# Patient Record
Sex: MALE | Race: OTHER | NOT HISPANIC OR LATINO | Employment: UNEMPLOYED | URBAN - METROPOLITAN AREA
[De-identification: names, ages, dates, MRNs, and addresses within clinical notes are randomized per-mention and may not be internally consistent; named-entity substitution may affect disease eponyms.]

---

## 2020-02-09 ENCOUNTER — HOSPITAL ENCOUNTER (EMERGENCY)
Facility: HOSPITAL | Age: 30
Discharge: HOME | End: 2020-02-10
Attending: EMERGENCY MEDICINE
Payer: MEDICAID

## 2020-02-09 DIAGNOSIS — R09.1 PLEURISY: Primary | ICD-10-CM

## 2020-02-09 DIAGNOSIS — R07.9 CHEST PAIN: ICD-10-CM

## 2020-02-09 LAB
ALBUMIN SERPL BCP-MCNC: 4.1 G/DL (ref 3.5–5.2)
ALP SERPL-CCNC: 53 U/L (ref 55–135)
ALT SERPL W/O P-5'-P-CCNC: 34 U/L (ref 10–44)
ANION GAP SERPL CALC-SCNC: 11 MMOL/L (ref 8–16)
AST SERPL-CCNC: 27 U/L (ref 10–40)
BASOPHILS # BLD AUTO: 0.06 K/UL (ref 0–0.2)
BASOPHILS NFR BLD: 0.6 % (ref 0–1.9)
BILIRUB SERPL-MCNC: 0.3 MG/DL (ref 0.1–1)
BUN SERPL-MCNC: 14 MG/DL (ref 6–20)
CALCIUM SERPL-MCNC: 9.3 MG/DL (ref 8.7–10.5)
CHLORIDE SERPL-SCNC: 107 MMOL/L (ref 95–110)
CO2 SERPL-SCNC: 23 MMOL/L (ref 23–29)
CREAT SERPL-MCNC: 0.9 MG/DL (ref 0.5–1.4)
D DIMER PPP IA.FEU-MCNC: 0.51 MG/L FEU
DIFFERENTIAL METHOD: ABNORMAL
EOSINOPHIL # BLD AUTO: 0.6 K/UL (ref 0–0.5)
EOSINOPHIL NFR BLD: 5.7 % (ref 0–8)
ERYTHROCYTE [DISTWIDTH] IN BLOOD BY AUTOMATED COUNT: 12.3 % (ref 11.5–14.5)
EST. GFR  (AFRICAN AMERICAN): >60 ML/MIN/1.73 M^2
EST. GFR  (NON AFRICAN AMERICAN): >60 ML/MIN/1.73 M^2
GLUCOSE SERPL-MCNC: 119 MG/DL (ref 70–110)
HCT VFR BLD AUTO: 45.1 % (ref 40–54)
HGB BLD-MCNC: 15 G/DL (ref 14–18)
IMM GRANULOCYTES # BLD AUTO: 0.04 K/UL (ref 0–0.04)
IMM GRANULOCYTES NFR BLD AUTO: 0.4 % (ref 0–0.5)
LYMPHOCYTES # BLD AUTO: 3.3 K/UL (ref 1–4.8)
LYMPHOCYTES NFR BLD: 33.9 % (ref 18–48)
MAGNESIUM SERPL-MCNC: 2.1 MG/DL (ref 1.6–2.6)
MCH RBC QN AUTO: 30.7 PG (ref 27–31)
MCHC RBC AUTO-ENTMCNC: 33.3 G/DL (ref 32–36)
MCV RBC AUTO: 92 FL (ref 82–98)
MONOCYTES # BLD AUTO: 1 K/UL (ref 0.3–1)
MONOCYTES NFR BLD: 10.4 % (ref 4–15)
NEUTROPHILS # BLD AUTO: 4.8 K/UL (ref 1.8–7.7)
NEUTROPHILS NFR BLD: 49 % (ref 38–73)
NRBC BLD-RTO: 0 /100 WBC
PLATELET # BLD AUTO: 194 K/UL (ref 150–350)
PMV BLD AUTO: 11.2 FL (ref 9.2–12.9)
POTASSIUM SERPL-SCNC: 4.1 MMOL/L (ref 3.5–5.1)
PROT SERPL-MCNC: 7.2 G/DL (ref 6–8.4)
RBC # BLD AUTO: 4.89 M/UL (ref 4.6–6.2)
SODIUM SERPL-SCNC: 141 MMOL/L (ref 136–145)
TROPONIN I SERPL DL<=0.01 NG/ML-MCNC: <0.006 NG/ML (ref 0–0.03)
WBC # BLD AUTO: 9.78 K/UL (ref 3.9–12.7)

## 2020-02-09 PROCEDURE — 84484 ASSAY OF TROPONIN QUANT: CPT

## 2020-02-09 PROCEDURE — 80053 COMPREHEN METABOLIC PANEL: CPT

## 2020-02-09 PROCEDURE — 99285 EMERGENCY DEPT VISIT HI MDM: CPT | Mod: 25

## 2020-02-09 PROCEDURE — 85379 FIBRIN DEGRADATION QUANT: CPT

## 2020-02-09 PROCEDURE — 83735 ASSAY OF MAGNESIUM: CPT

## 2020-02-09 PROCEDURE — 93010 EKG 12-LEAD: ICD-10-PCS | Mod: ,,, | Performed by: INTERNAL MEDICINE

## 2020-02-09 PROCEDURE — 63600175 PHARM REV CODE 636 W HCPCS: Performed by: EMERGENCY MEDICINE

## 2020-02-09 PROCEDURE — 93010 ELECTROCARDIOGRAM REPORT: CPT | Mod: ,,, | Performed by: INTERNAL MEDICINE

## 2020-02-09 PROCEDURE — 96374 THER/PROPH/DIAG INJ IV PUSH: CPT

## 2020-02-09 PROCEDURE — 85025 COMPLETE CBC W/AUTO DIFF WBC: CPT

## 2020-02-09 PROCEDURE — 93005 ELECTROCARDIOGRAM TRACING: CPT

## 2020-02-09 RX ORDER — KETOROLAC TROMETHAMINE 30 MG/ML
15 INJECTION, SOLUTION INTRAMUSCULAR; INTRAVENOUS
Status: COMPLETED | OUTPATIENT
Start: 2020-02-09 | End: 2020-02-09

## 2020-02-09 RX ADMIN — KETOROLAC TROMETHAMINE 15 MG: 30 INJECTION, SOLUTION INTRAMUSCULAR at 11:02

## 2020-02-10 VITALS
HEART RATE: 70 BPM | TEMPERATURE: 98 F | SYSTOLIC BLOOD PRESSURE: 116 MMHG | RESPIRATION RATE: 16 BRPM | OXYGEN SATURATION: 95 % | WEIGHT: 210 LBS | DIASTOLIC BLOOD PRESSURE: 73 MMHG

## 2020-02-10 RX ORDER — MELOXICAM 7.5 MG/1
7.5 TABLET ORAL DAILY
Qty: 20 TABLET | Refills: 0 | OUTPATIENT
Start: 2020-02-10 | End: 2023-06-25

## 2020-02-10 NOTE — ED TRIAGE NOTES
Pt presents to ED c/o R side chest pain for 2-3 days. Pt reports pain with inspiration. Denies injury. Denies medical hx. Also c/o headache.

## 2020-02-10 NOTE — ED PROVIDER NOTES
"Encounter Date: 2/9/2020    SCRIBE #1 NOTE: I, Terence Carrillo, am scribing for, and in the presence of,  Jeff Gates MD. I have scribed the following portions of the note - Other sections scribed: HPI, ROS, PE.       History     Chief Complaint   Patient presents with    Shortness of Breath     pt states "sometimes it feels like it's hard to breathe" ongoing for 2-3 days; pt also reports pain to right, lower, anterior rib cage when taking deep breathes; pt denies any injuries to chest; pt reports SOB is much worse when sitting down with legs up; no distress noted at triage     This is a 29 y.o. Male with a PMHx of kidney stones who presents to the ED with c/o intermittent lower, anterior, right-sided CP rated 9/10 and SOB that began 2 days ago. CP worsens with breathing and coughing. Denies taking any meds for current symptoms. Denies any recent injury, fever, dysuria, hematuria, back pain, abd pain, leg swelling, recent travel, or any other symptoms. Pt notes that he had a cough yesterday which reproduced the pain but that has since alleviated.  Cough is nonproductive.  Denies trauma. No recent heavy lifting.  The pt smokes cigarettes daily.  No family history of DVT or pulmonary embolism.  No palpitations.    The history is provided by the patient and medical records.     Review of patient's allergies indicates:  No Known Allergies  No past medical history on file.  No past surgical history on file.  No family history on file.  Social History     Tobacco Use    Smoking status: Not on file   Substance Use Topics    Alcohol use: Not on file    Drug use: Not on file     Review of Systems   Constitutional: Negative for chills, diaphoresis and fever.   HENT: Negative for congestion and sore throat.    Eyes: Negative.  Negative for visual disturbance.   Respiratory: Positive for shortness of breath. Negative for cough and wheezing.    Cardiovascular: Positive for chest pain. Negative for palpitations and leg " swelling.   Gastrointestinal: Negative for abdominal pain, diarrhea, nausea and vomiting.        NO melena or rectal bleeding   Genitourinary: Negative for dysuria, flank pain, frequency and hematuria.   Musculoskeletal: Negative for back pain.   Skin: Negative for rash and wound.   Neurological: Negative for dizziness, syncope, weakness, numbness and headaches.        No numbness   Psychiatric/Behavioral: Negative for confusion.   All other systems reviewed and are negative.      Physical Exam     Initial Vitals [02/09/20 2300]   BP Pulse Resp Temp SpO2   (!) 153/91 75 18 97.5 °F (36.4 °C) 99 %      MAP       --         Physical Exam    Nursing note and vitals reviewed.  Constitutional: He appears well-developed and well-nourished. He is not diaphoretic. No distress.   HENT:   Head: Normocephalic and atraumatic.   Nose: Nose normal.   Mouth/Throat: Oropharynx is clear and moist.   Eyes: Conjunctivae and EOM are normal. Pupils are equal, round, and reactive to light. Right eye exhibits no discharge. Left eye exhibits no discharge.   Neck: Neck supple. No tracheal deviation present.   Cardiovascular: Normal rate, regular rhythm and normal heart sounds.   No murmur heard.  Pulmonary/Chest: Breath sounds normal. No stridor. No respiratory distress. He has no wheezes. He has no rhonchi. He has no rales. He exhibits no tenderness.   Abdominal: Soft. He exhibits no distension. There is no tenderness. There is no rebound and no guarding.   Musculoskeletal: Normal range of motion. He exhibits no edema or tenderness.   Neurological: He is alert and oriented to person, place, and time.   Skin: Skin is warm and dry. No rash noted.   Psychiatric: He has a normal mood and affect. His behavior is normal. Judgment and thought content normal.         ED Course   Procedures  Labs Reviewed - No data to display  EKG Readings: (Independently Interpreted)   Initial Reading: No STEMI. Rhythm: Normal Sinus Rhythm. Heart Rate: 72. Ectopy:  No Ectopy. Conduction: Normal. ST Segments: Normal ST Segments. T Waves: Normal. Clinical Impression: Normal Sinus Rhythm       Imaging Results    None          Medical Decision Making:   History:   Old Medical Records: I decided to obtain old medical records.  Initial Assessment:   Patient presents for evaluation of right-sided pleuritic chest pain. Normal pulmonary exam.  No rib cage tenderness. Normal EKG.  No clinical evidence of DVT.  Suspect pleurisy.  Will obtain chest x-ray.  Will check lab work.  Perc score is 0.  Differential Diagnosis:   Pleurisy, costochondritis pneumothorax, pneumonia, pleural effusion, pulmonary embolism  Independently Interpreted Test(s):   I have ordered and independently interpreted X-rays - see prior notes.  I have ordered and independently interpreted EKG Reading(s) - see prior notes  Clinical Tests:   Lab Tests: Ordered and Reviewed  The following lab test(s) were unremarkable: CBC, CMP, D-Dimer and Troponin  Radiological Study: Ordered  Medical Tests: Ordered and Reviewed  ED Management:  0020:  Pain resolved.  Chest x-ray normal. Lab work is unremarkable except for minimal elevation D-dimer which I think is clinically insignificant.  Perc score is 0.  Heart score is 0.  Will treat patient for pleurisy.  Patient advised to quit smoking.    Additional MDM:   PERC Rule:   Age is greater than or equal to 50 = 0.0  Heart Rate is greater than or equal to 100 = 0.0  SaO2 on room air < 95% = 0.0  Unilateral leg swelling = 0.0  Hemoptysis = 0.0  Recent surgery or trauma = 0.0  Prior PE or DVT =  0.0  Hormone use = 0.00  PERC Score = 0  Heart Score:    History:          Slightly suspicious.  ECG:             Normal  Age:               Less than 45 years  Risk factors: no risk factors known  Troponin:       Less than or equal to normal limit  Final Score: 0             Scribe Attestation:   Scribe #1: I performed the above scribed service and the documentation accurately describes the  services I performed. I attest to the accuracy of the note.                          Clinical Impression:       ICD-10-CM ICD-9-CM   1. Chest pain R07.9 786.50   2. Chest pain R07.9 786.50         Disposition:   Disposition: Discharged  Condition: Stable     I, Jeff Gates MD, personally performed the services described in this documentation. All medical record entries made by the scribe were at my direction and in my presence.  I have reviewed the chart and agree that the record reflects my personal performance and is accurate and complete                    Jeff Gates MD  02/10/20 0020

## 2020-03-15 ENCOUNTER — HOSPITAL ENCOUNTER (EMERGENCY)
Facility: HOSPITAL | Age: 30
Discharge: HOME | End: 2020-03-15
Attending: EMERGENCY MEDICINE
Payer: MEDICAID

## 2020-03-15 VITALS
WEIGHT: 198 LBS | SYSTOLIC BLOOD PRESSURE: 123 MMHG | RESPIRATION RATE: 18 BRPM | OXYGEN SATURATION: 98 % | HEIGHT: 66 IN | BODY MASS INDEX: 31.82 KG/M2 | DIASTOLIC BLOOD PRESSURE: 59 MMHG | HEART RATE: 50 BPM | TEMPERATURE: 97 F

## 2020-03-15 DIAGNOSIS — N20.0 KIDNEY STONE: Primary | ICD-10-CM

## 2020-03-15 LAB
BILIRUB UR QL STRIP: NEGATIVE
CLARITY UR: CLEAR
COLOR UR: ABNORMAL
GLUCOSE UR QL STRIP: NEGATIVE
HGB UR QL STRIP: ABNORMAL
KETONES UR QL STRIP: NEGATIVE
LEUKOCYTE ESTERASE UR QL STRIP: NEGATIVE
MICROSCOPIC COMMENT: ABNORMAL
NITRITE UR QL STRIP: NEGATIVE
PH UR STRIP: 6 [PH] (ref 5–8)
PROT UR QL STRIP: NEGATIVE
RBC #/AREA URNS HPF: 15 /HPF (ref 0–4)
SP GR UR STRIP: 1 (ref 1–1.03)
URN SPEC COLLECT METH UR: ABNORMAL
UROBILINOGEN UR STRIP-ACNC: NEGATIVE EU/DL
WBC #/AREA URNS HPF: 2 /HPF (ref 0–5)

## 2020-03-15 PROCEDURE — 99283 EMERGENCY DEPT VISIT LOW MDM: CPT

## 2020-03-15 PROCEDURE — 25000003 PHARM REV CODE 250: Performed by: PHYSICIAN ASSISTANT

## 2020-03-15 PROCEDURE — 81000 URINALYSIS NONAUTO W/SCOPE: CPT

## 2020-03-15 RX ORDER — PHENAZOPYRIDINE HYDROCHLORIDE 200 MG/1
200 TABLET, FILM COATED ORAL 3 TIMES DAILY
Qty: 15 TABLET | Refills: 0 | Status: SHIPPED | OUTPATIENT
Start: 2020-03-15 | End: 2020-03-20

## 2020-03-15 RX ORDER — PHENAZOPYRIDINE HYDROCHLORIDE 100 MG/1
200 TABLET, FILM COATED ORAL
Status: COMPLETED | OUTPATIENT
Start: 2020-03-15 | End: 2020-03-15

## 2020-03-15 RX ORDER — IBUPROFEN 600 MG/1
600 TABLET ORAL
Status: COMPLETED | OUTPATIENT
Start: 2020-03-15 | End: 2020-03-15

## 2020-03-15 RX ADMIN — IBUPROFEN 600 MG: 600 TABLET, FILM COATED ORAL at 11:03

## 2020-03-15 RX ADMIN — PHENAZOPYRIDINE HYDROCHLORIDE 200 MG: 100 TABLET ORAL at 11:03

## 2020-03-15 NOTE — DISCHARGE INSTRUCTIONS
Drink plenty of fluids. This increases urine flow and reduces the chance that a new stone will form. Healthy adults (no heart/liver/kidney disease) who have had a kidney stone should drink 12, 8-ounce glasses of fluids per day. Most of this should be water. The goal is to produce 1.5 to 2 quarts of almost colorless urine per 24 hours.  Take Pyridium as needed for urinary discomfort. A common side effect is turning your urine orange, this is common and harmless.  Follow-up with Urology with a collected stone if possible.  Return to the ED for any concerning symptoms.    Our goal in the emergency department is to always give you outstanding care and exceptional service. You may receive a survey by mail or e-mail in the next week regarding your experience in our ED. We would greatly appreciate your completing and returning the survey. Your feedback provides us with a way to recognize our staff who give very good care and it helps us learn how to improve when your experience was below our aspiration of excellence.

## 2020-03-15 NOTE — ED PROVIDER NOTES
Encounter Date: 3/15/2020    SCRIBE #1 NOTE: I, Loan Daly, am scribing for, and in the presence of,  Arielle Booth PA-C. I have scribed the following portions of the note - Other sections scribed: HPI, ROS, PE.       History     Chief Complaint   Patient presents with    Flank Pain     right flank pain started today.  been having burning with urination x 2 days.  denies n/v/d or fever.     30 year old male patient with a past medical history of kidney stones presents to the ED complaining of 2-3 days of dysuria. He also reports right flank pain onset today that resolved after giving his urine sample at the ED. He reports that his symptoms felt similar to the last time he had kidney stones. He also complains of right-sided otalgia status post cleaning his ears. He reports that he attempted treatment with ear drops. He denies any chills, fever, sore throat, abdominal pain, nausea, emesis, penile discharge, or penile pain.     The history is provided by the patient.     Review of patient's allergies indicates:  No Known Allergies  History reviewed. No pertinent past medical history.  History reviewed. No pertinent surgical history.  History reviewed. No pertinent family history.  Social History     Tobacco Use    Smoking status: Current Every Day Smoker    Smokeless tobacco: Never Used   Substance Use Topics    Alcohol use: Never     Frequency: Never    Drug use: Never     Review of Systems   Constitutional: Negative for chills and fever.   HENT: Positive for ear pain (Right). Negative for sore throat.    Eyes: Negative for visual disturbance.   Respiratory: Negative for shortness of breath.    Cardiovascular: Negative for chest pain.   Gastrointestinal: Negative for abdominal pain, diarrhea, nausea and vomiting.   Genitourinary: Positive for dysuria and flank pain (Right). Negative for discharge and penile pain.   Musculoskeletal: Negative for neck pain.   Skin: Negative for rash.   Allergic/Immunologic:  Negative for immunocompromised state.   Neurological: Negative for headaches.   Psychiatric/Behavioral: Negative for dysphoric mood.       Physical Exam     Initial Vitals [03/15/20 0905]   BP Pulse Resp Temp SpO2   (!) 148/70 (!) 50 15 97.5 °F (36.4 °C) 100 %      MAP       --         Physical Exam    Nursing note and vitals reviewed.  Constitutional: He appears well-developed and well-nourished. He is not diaphoretic. No distress.   HENT:   Head: Normocephalic and atraumatic.   Right Ear: Tympanic membrane normal.   Left Ear: Tympanic membrane normal.   Mouth/Throat: Oropharynx is clear and moist. No oropharyngeal exudate.   Eyes: Conjunctivae and EOM are normal. Pupils are equal, round, and reactive to light. No scleral icterus.   Neck: Normal range of motion. Neck supple.   Cardiovascular: Normal rate, regular rhythm, normal heart sounds and intact distal pulses.   No murmur heard.  Pulmonary/Chest: Breath sounds normal. No respiratory distress. He has no wheezes. He has no rales.   Abdominal: Soft. Bowel sounds are normal. There is no tenderness. There is no rebound and no guarding.   Abdomen soft, nondistended, and nontender.  No rebound or guarding.  Very mild right CVAT.   Musculoskeletal: Normal range of motion. He exhibits no edema or tenderness.   Neurological: He is alert and oriented to person, place, and time. GCS score is 15. GCS eye subscore is 4. GCS verbal subscore is 5. GCS motor subscore is 6.   Skin: Skin is warm and dry.   Psychiatric: He has a normal mood and affect. Thought content normal.         ED Course   Procedures  Labs Reviewed   URINALYSIS, REFLEX TO URINE CULTURE - Abnormal; Notable for the following components:       Result Value    Occult Blood UA 3+ (*)     All other components within normal limits    Narrative:     Preferred Collection Type->Urine, Clean Catch   URINALYSIS MICROSCOPIC - Abnormal; Notable for the following components:    RBC, UA 15 (*)     All other components  within normal limits    Narrative:     Preferred Collection Type->Urine, Clean Catch          Imaging Results    None          Medical Decision Making:   History:   Old Medical Records: I decided to obtain old medical records.  Clinical Tests:   Lab Tests: Reviewed and Ordered       APC / Resident Notes:   30 year old presenting 2/2 kidney stone pain. Patient is otherwise well appearing with low suspicion for sepsis, dissection, infected obstructed renal colic, appendicitis, torsion, acute edis, or intra-abdominal infection.     While in the ED, patient passed kidney stone; reports near-complete resolution of pain.   Patient given Pyridium and Toradol for interventions which improved pain and condition. UA without evidence of infection. Vital signs are reassuring. I do not feel additional workup is necessary at this time.    Discussed conservative management, strict return precautions and follow up with urology. Will discharge home with pain medications and strainer. Patient tolerating PO and pain controlled prior to discharge. Strict return precautions for infected stone or PO intolerance discussed. Return precautions given, patient understands and agrees with plan. All questions answered.  Instructed to follow up with PCP.                               Clinical Impression:       ICD-10-CM ICD-9-CM   1. Kidney stone N20.0 592.0         Disposition:   Disposition: Discharged  Condition: Stable     ED Disposition Condition    Discharge Stable        ED Prescriptions     Medication Sig Dispense Start Date End Date Auth. Provider    phenazopyridine (PYRIDIUM) 200 MG tablet Take 1 tablet (200 mg total) by mouth 3 (three) times daily. for 5 days 15 tablet 3/15/2020 3/20/2020 Arielle Booth PA-C        Follow-up Information     Follow up With Specialties Details Why Contact [facility] - Nephrology/Colon & Rectal/Urology Nephrology, Colon and Rectal Surgery, Urology Schedule an appointment as soon  as possible for a visit  To follow-up with Urology [address] [place] [id number]  [phone]                          I, Arielle Booth, personally performed the services described in this documentation. All medical record entries made by the scribe were at my direction and in my presence. I have reviewed the chart and agree that the record reflects my personal performance and is accurate and complete.             Arielle Booth PA-C  03/15/20 8537

## 2020-03-15 NOTE — ED TRIAGE NOTES
Pt c/o burning with urination, urinary urgency x3 days, RIGHT-flank pain that started today. Pt reports hx of kidney stones and states that's what it feels like. Denies N/V/D, penile discharge. Pain is 8/10 when urinating

## 2020-05-01 ENCOUNTER — HOSPITAL ENCOUNTER (EMERGENCY)
Facility: HOSPITAL | Age: 30
Discharge: HOME | End: 2020-05-01
Attending: EMERGENCY MEDICINE
Payer: MEDICAID

## 2020-05-01 VITALS
DIASTOLIC BLOOD PRESSURE: 81 MMHG | OXYGEN SATURATION: 97 % | RESPIRATION RATE: 18 BRPM | WEIGHT: 200 LBS | HEART RATE: 72 BPM | SYSTOLIC BLOOD PRESSURE: 124 MMHG | BODY MASS INDEX: 23.62 KG/M2 | HEIGHT: 77 IN | TEMPERATURE: 98 F

## 2020-05-01 DIAGNOSIS — R42 DIZZINESS: ICD-10-CM

## 2020-05-01 LAB
BILIRUB UR QL STRIP: NEGATIVE
CLARITY UR: CLEAR
COLOR UR: YELLOW
GLUCOSE UR QL STRIP: NEGATIVE
HGB UR QL STRIP: NEGATIVE
KETONES UR QL STRIP: NEGATIVE
LEUKOCYTE ESTERASE UR QL STRIP: NEGATIVE
NITRITE UR QL STRIP: NEGATIVE
PH UR STRIP: 5 [PH] (ref 5–8)
PROT UR QL STRIP: NEGATIVE
SP GR UR STRIP: 1.02 (ref 1–1.03)
URN SPEC COLLECT METH UR: NORMAL
UROBILINOGEN UR STRIP-ACNC: NEGATIVE EU/DL

## 2020-05-01 PROCEDURE — 25000003 PHARM REV CODE 250: Performed by: NURSE PRACTITIONER

## 2020-05-01 PROCEDURE — 81003 URINALYSIS AUTO W/O SCOPE: CPT

## 2020-05-01 PROCEDURE — 93010 ELECTROCARDIOGRAM REPORT: CPT | Mod: ,,, | Performed by: INTERNAL MEDICINE

## 2020-05-01 PROCEDURE — 93005 ELECTROCARDIOGRAM TRACING: CPT

## 2020-05-01 PROCEDURE — 93010 EKG 12-LEAD: ICD-10-PCS | Mod: ,,, | Performed by: INTERNAL MEDICINE

## 2020-05-01 PROCEDURE — 99283 EMERGENCY DEPT VISIT LOW MDM: CPT | Mod: 25

## 2020-05-01 RX ORDER — MECLIZINE HYDROCHLORIDE 25 MG/1
25 TABLET ORAL
Status: COMPLETED | OUTPATIENT
Start: 2020-05-01 | End: 2020-05-01

## 2020-05-01 RX ORDER — MECLIZINE HYDROCHLORIDE 25 MG/1
25 TABLET ORAL 3 TIMES DAILY PRN
Qty: 20 TABLET | Refills: 0 | OUTPATIENT
Start: 2020-05-01 | End: 2023-06-25

## 2020-05-01 RX ADMIN — MECLIZINE HYDROCHLORIDE 25 MG: 25 TABLET ORAL at 02:05

## 2020-05-01 NOTE — DISCHARGE INSTRUCTIONS
Thank you for allowing me to care for you today.  I hope our treatment plan will make you feel better in the next few days.  In order for me to take better care of my future patients and improve our Emergency Department, I would appreciate if you can provide us with feedback.  In the next few days, you may receive a survey in the mail.  If you do, it would mean a great deal to me if you would please take the time to complete it.    Thank you and I hope you feel better.  Arielle Henderson NP

## 2020-05-01 NOTE — ED TRIAGE NOTES
"Pt presents to the ED via personal transportation reporting that he is currently fasting but broke it due to feeling "drowsy and not completely awake" x 2 days. Pt also reporting generalized weakness. Pt denies pain at this time. Pt also reporting a cough as well. Pt AAOx4.  "

## 2020-05-02 NOTE — ED PROVIDER NOTES
"Encounter Date: 5/1/2020       History     Chief Complaint   Patient presents with    Dizziness     Reports started feeling dizzy with fasting, broke fast yesterday and still has dizziness, feeling foggy, and coughing, denies fevers     The patient is a 30 year-old male with no pertinent past medical history who presents to the ED complaining of dizziness for the past 2-3 days that he describes as a sensation of the room spinning.  He also reports associated "fogginess danielle like I'm drunk".  Dizziness symptoms are worse with change of head position.  No history of similar complaints in the past.  No known history of vertigo.  He denies fever, chills, visual disturbance, nausea, vomiting, abdominal pain, and urinary complaints.  No recent URI symptoms.  He has been participating in [employer] fasting, but broke fast yesterday.  No known history of blood sugar issues.  No treatment attempting prior to arrival.    The history is provided by the patient.     Review of patient's allergies indicates:  No Known Allergies  History reviewed. No pertinent past medical history.  History reviewed. No pertinent surgical history.  History reviewed. No pertinent family history.  Social History     Tobacco Use    Smoking status: Current Every Day Smoker    Smokeless tobacco: Never Used   Substance Use Topics    Alcohol use: Never     Frequency: Never    Drug use: Never     Review of Systems   Constitutional: Negative for chills and fever.   HENT: Negative for sore throat.    Eyes: Negative for visual disturbance.   Respiratory: Negative for cough and shortness of breath.    Cardiovascular: Negative for chest pain.   Gastrointestinal: Negative for abdominal pain, nausea and vomiting.   Genitourinary: Negative for dysuria.   Musculoskeletal: Negative for back pain.   Skin: Negative for rash.   Neurological: Positive for dizziness. Negative for weakness.   Hematological: Does not bruise/bleed easily.       Physical Exam "     Initial Vitals [05/01/20 1335]   BP Pulse Resp Temp SpO2   132/81 79 20 98.4 °F (36.9 °C) 98 %      MAP       --         Physical Exam    Nursing note and vitals reviewed.  Constitutional: He appears well-developed and well-nourished.  Non-toxic appearance. No distress.   The patient is alert, oriented, and nontoxic-appearing.  No apparent distress.   HENT:   Head: Normocephalic and atraumatic.   Right Ear: Hearing and abnromal external ear normal.   Left Ear: Hearing and abnormal external ear normal.   Nose: Nose abnormal.   Mouth/Throat: Uvula is midline, oropharynx is clear and moist and mucous membranes are normal.   No facial asymmetry   Eyes: Conjunctivae are normal. Pupils are equal, round, and reactive to light. Right eye exhibits nystagmus. Left eye exhibits nystagmus. Right pupil is round and reactive. Left pupil is round and reactive. Pupils are equal.   Subtle horizontal nystagmus noted   Neck: Full passive range of motion without pain. Neck supple.   No meningismus   Cardiovascular: Normal rate, regular rhythm, normal heart sounds and normal pulses. Exam reveals no gallop and no friction rub.    No murmur heard.  Pulses:       Radial pulses are 2+ on the right side, and 2+ on the left side.        Dorsalis pedis pulses are 2+ on the right side, and 2+ on the left side.   Pulmonary/Chest: Effort normal and breath sounds normal. No respiratory distress. He has no decreased breath sounds. He has no wheezes. He has no rhonchi. He has no rales.   Abdominal: Soft. Bowel sounds are normal. There is no tenderness. There is no rigidity, no rebound and no guarding.   Musculoskeletal: Normal range of motion.   Neurological: He is alert and oriented to person, place, and time. He has normal strength. No cranial nerve deficit or sensory deficit. He exhibits normal muscle tone. He displays no seizure activity. Coordination and gait normal. GCS eye subscore is 4. GCS verbal subscore is 5. GCS motor subscore is 6.    No sensory deficits.  Muscular strength is normal and equal bilaterally.  No pronator drift.  Gait is steady and even.  Finger to nose, heel to shin, and alternating hands are all within normal limits.  Positive left-sided eli hallpike.   Skin: Skin is warm, dry and intact. Capillary refill takes less than 2 seconds. No rash noted.   Psychiatric: He has a normal mood and affect. His speech is normal and behavior is normal. Judgment and thought content normal. Cognition and memory are normal.         ED Course   Procedures  Labs Reviewed   URINALYSIS, REFLEX TO URINE CULTURE    Narrative:     Preferred Collection Type->Urine, Clean Catch     EKG Readings: (Independently Interpreted)   Initial Reading: No STEMI. Rhythm: Normal Sinus Rhythm. Heart Rate: 62. Ectopy: No Ectopy. Conduction: Normal.       Imaging Results    None          Medical Decision Making:   History:   Old Medical Records: I decided to obtain old medical records.  Clinical Tests:   Lab Tests: Ordered and Reviewed  Medical Tests: Ordered and Reviewed  ED Management:  This is an emergent evaluation of a 30 year-old male who presents to the ED complaining of dizziness for the past several days.    Physical exam is unremarkable.  Patient is neurologically intact on exam with no focal deficits. Patient is afebrile with stable vital signs.    ECG does not demonstrate evidence of acute ischemia or dysrhythmia.    Orthostatics are negative.    POCT glucose is within normal limits.    UA does not demonstrate evidence of infection.    Eli hallpike to left is positive.  Patient then reports improvement of symptoms following instructions on how to perform the Epley maneuver. Meclizine also administered for symptomatic relief.    Based on history and physical exam, as well as diagnostic results, I considered but do not suspect meningitis, CVA, ICH, pneumonia, UTI, cardiac dysrhythmia, or orthostatic hypotension.  Clinical presentation suggests probably BPPV.   Patient will be discharged with a prescription for meclizine.  Ambulatory referral has also been placed to ENT for follow up.  Instructions given on Epley maneuver to do at home.  All questions answered.  Return precautions given.  Case discussed with supervising physician who agrees with plan.                                     Clinical Impression:       ICD-10-CM ICD-9-CM   1. Dizziness R42 780.4             ED Disposition Condition    Discharge Stable        ED Prescriptions     Medication Sig Dispense Start Date End Date Auth. Provider    meclizine (ANTIVERT) 25 mg tablet Take 1 tablet (25 mg total) by mouth 3 (three) times daily as needed. 20 tablet 5/1/2020  Arielle Henderson NP        Follow-up Information     Follow up With Specialties Details Why Contact Info    [facility] [place] [facility] [place]ology Schedule an appointment as soon as possible for a visit  For re-check with specialist [address]  [facility] acute hospital [address] [phone]    Your primary care provider    follow-up in 2-3 days    [facility] [place]  If symptoms worsen [address]  [facility] acute hospital [address] [phone]                                     Arielle Henderson NP  05/02/20 6843

## 2021-04-16 ENCOUNTER — PATIENT MESSAGE (OUTPATIENT)
Dept: RESEARCH | Facility: HOSPITAL | Age: 31
End: 2021-04-16

## 2021-05-07 ENCOUNTER — HOSPITAL ENCOUNTER (EMERGENCY)
Facility: HOSPITAL | Age: 31
Discharge: HOME | End: 2021-05-07
Attending: EMERGENCY MEDICINE
Payer: MEDICAID

## 2021-05-07 VITALS
OXYGEN SATURATION: 96 % | RESPIRATION RATE: 20 BRPM | DIASTOLIC BLOOD PRESSURE: 75 MMHG | BODY MASS INDEX: 32.95 KG/M2 | HEART RATE: 89 BPM | WEIGHT: 205 LBS | HEIGHT: 66 IN | TEMPERATURE: 99 F | SYSTOLIC BLOOD PRESSURE: 123 MMHG

## 2021-05-07 DIAGNOSIS — R07.9 CHEST PAIN: ICD-10-CM

## 2021-05-07 DIAGNOSIS — J06.9 VIRAL URI WITH COUGH: Primary | ICD-10-CM

## 2021-05-07 DIAGNOSIS — R05.9 COUGH: ICD-10-CM

## 2021-05-07 LAB
CTP QC/QA: YES
CTP QC/QA: YES
POC MOLECULAR INFLUENZA A AGN: NEGATIVE
POC MOLECULAR INFLUENZA B AGN: NEGATIVE
SARS-COV-2 RDRP RESP QL NAA+PROBE: NEGATIVE

## 2021-05-07 PROCEDURE — 93005 ELECTROCARDIOGRAM TRACING: CPT

## 2021-05-07 PROCEDURE — U0002 COVID-19 LAB TEST NON-CDC: HCPCS | Performed by: NURSE PRACTITIONER

## 2021-05-07 PROCEDURE — 93010 EKG 12-LEAD: ICD-10-PCS | Mod: ,,, | Performed by: INTERNAL MEDICINE

## 2021-05-07 PROCEDURE — 99284 EMERGENCY DEPT VISIT MOD MDM: CPT | Mod: 25

## 2021-05-07 PROCEDURE — 87502 INFLUENZA DNA AMP PROBE: CPT

## 2021-05-07 PROCEDURE — 99285 EMERGENCY DEPT VISIT HI MDM: CPT | Mod: 25

## 2021-05-07 PROCEDURE — 93010 ELECTROCARDIOGRAM REPORT: CPT | Mod: ,,, | Performed by: INTERNAL MEDICINE

## 2021-05-07 RX ORDER — ALBUTEROL SULFATE 90 UG/1
1-2 AEROSOL, METERED RESPIRATORY (INHALATION) EVERY 6 HOURS PRN
Qty: 8 G | Refills: 0 | Status: SHIPPED | OUTPATIENT
Start: 2021-05-07 | End: 2021-08-21 | Stop reason: SDUPTHER

## 2021-05-07 RX ORDER — BENZONATATE 100 MG/1
100 CAPSULE ORAL 3 TIMES DAILY PRN
Qty: 20 CAPSULE | Refills: 0 | Status: SHIPPED | OUTPATIENT
Start: 2021-05-07 | End: 2021-08-21 | Stop reason: SDUPTHER

## 2021-05-07 RX ORDER — CETIRIZINE HYDROCHLORIDE 10 MG/1
10 TABLET ORAL DAILY
Qty: 30 TABLET | Refills: 0 | Status: SHIPPED | OUTPATIENT
Start: 2021-05-07 | End: 2021-08-21 | Stop reason: SDUPTHER

## 2021-08-21 ENCOUNTER — HOSPITAL ENCOUNTER (EMERGENCY)
Facility: HOSPITAL | Age: 31
Discharge: HOME | End: 2021-08-21
Attending: EMERGENCY MEDICINE
Payer: MEDICAID

## 2021-08-21 VITALS
HEART RATE: 88 BPM | HEIGHT: 67 IN | DIASTOLIC BLOOD PRESSURE: 75 MMHG | WEIGHT: 190 LBS | BODY MASS INDEX: 29.82 KG/M2 | RESPIRATION RATE: 18 BRPM | SYSTOLIC BLOOD PRESSURE: 119 MMHG | OXYGEN SATURATION: 97 % | TEMPERATURE: 99 F

## 2021-08-21 DIAGNOSIS — J06.9 VIRAL URI WITH COUGH: Primary | ICD-10-CM

## 2021-08-21 LAB
CTP QC/QA: YES
SARS-COV-2 RDRP RESP QL NAA+PROBE: NEGATIVE

## 2021-08-21 PROCEDURE — 99284 EMERGENCY DEPT VISIT MOD MDM: CPT

## 2021-08-21 PROCEDURE — U0002 COVID-19 LAB TEST NON-CDC: HCPCS | Performed by: EMERGENCY MEDICINE

## 2021-08-21 RX ORDER — ALBUTEROL SULFATE 90 UG/1
1-2 AEROSOL, METERED RESPIRATORY (INHALATION) EVERY 6 HOURS PRN
Qty: 8 G | Refills: 0 | OUTPATIENT
Start: 2021-08-21 | End: 2023-06-25

## 2021-08-21 RX ORDER — FLUTICASONE PROPIONATE 50 MCG
1 SPRAY, SUSPENSION (ML) NASAL 2 TIMES DAILY PRN
Qty: 15 G | Refills: 0 | OUTPATIENT
Start: 2021-08-21 | End: 2023-06-25

## 2021-08-21 RX ORDER — BENZONATATE 100 MG/1
100 CAPSULE ORAL 3 TIMES DAILY PRN
Qty: 20 CAPSULE | Refills: 0 | OUTPATIENT
Start: 2021-08-21 | End: 2023-06-25

## 2021-08-21 RX ORDER — CETIRIZINE HYDROCHLORIDE 10 MG/1
10 TABLET ORAL DAILY
Qty: 30 TABLET | Refills: 0 | OUTPATIENT
Start: 2021-08-21 | End: 2023-06-25

## 2023-06-24 LAB — POCT GLUCOSE: 79 MG/DL (ref 70–110)

## 2023-06-24 PROCEDURE — 99285 EMERGENCY DEPT VISIT HI MDM: CPT | Mod: 25

## 2023-06-24 PROCEDURE — 82962 GLUCOSE BLOOD TEST: CPT

## 2023-06-25 ENCOUNTER — HOSPITAL ENCOUNTER (EMERGENCY)
Facility: HOSPITAL | Age: 33
Discharge: HOME | End: 2023-06-25
Attending: STUDENT IN AN ORGANIZED HEALTH CARE EDUCATION/TRAINING PROGRAM
Payer: MEDICAID

## 2023-06-25 VITALS
SYSTOLIC BLOOD PRESSURE: 126 MMHG | TEMPERATURE: 99 F | OXYGEN SATURATION: 95 % | WEIGHT: 210 LBS | RESPIRATION RATE: 18 BRPM | BODY MASS INDEX: 31.83 KG/M2 | HEART RATE: 55 BPM | DIASTOLIC BLOOD PRESSURE: 60 MMHG | HEIGHT: 68 IN

## 2023-06-25 DIAGNOSIS — J01.10 ACUTE NON-RECURRENT FRONTAL SINUSITIS: ICD-10-CM

## 2023-06-25 DIAGNOSIS — R51.9 FRONTAL HEADACHE: Primary | ICD-10-CM

## 2023-06-25 LAB
ALBUMIN SERPL BCP-MCNC: 4.3 G/DL (ref 3.5–5.2)
ALP SERPL-CCNC: 60 U/L (ref 55–135)
ALT SERPL W/O P-5'-P-CCNC: 48 U/L (ref 10–44)
ANION GAP SERPL CALC-SCNC: 11 MMOL/L (ref 8–16)
AST SERPL-CCNC: 31 U/L (ref 10–40)
BASOPHILS # BLD AUTO: 0.05 K/UL (ref 0–0.2)
BASOPHILS NFR BLD: 0.6 % (ref 0–1.9)
BILIRUB SERPL-MCNC: 0.3 MG/DL (ref 0.1–1)
BILIRUB UR QL STRIP: NEGATIVE
BUN SERPL-MCNC: 12 MG/DL (ref 6–20)
CALCIUM SERPL-MCNC: 9.6 MG/DL (ref 8.7–10.5)
CHLORIDE SERPL-SCNC: 107 MMOL/L (ref 95–110)
CLARITY UR: CLEAR
CO2 SERPL-SCNC: 23 MMOL/L (ref 23–29)
COLOR UR: YELLOW
CREAT SERPL-MCNC: 1 MG/DL (ref 0.5–1.4)
DIFFERENTIAL METHOD: NORMAL
EOSINOPHIL # BLD AUTO: 0.5 K/UL (ref 0–0.5)
EOSINOPHIL NFR BLD: 6.3 % (ref 0–8)
ERYTHROCYTE [DISTWIDTH] IN BLOOD BY AUTOMATED COUNT: 12.5 % (ref 11.5–14.5)
EST. GFR  (NO RACE VARIABLE): >60 ML/MIN/1.73 M^2
GLUCOSE SERPL-MCNC: 102 MG/DL (ref 70–110)
GLUCOSE UR QL STRIP: NEGATIVE
HCT VFR BLD AUTO: 44 % (ref 40–54)
HGB BLD-MCNC: 14.9 G/DL (ref 14–18)
HGB UR QL STRIP: NEGATIVE
IMM GRANULOCYTES # BLD AUTO: 0.02 K/UL (ref 0–0.04)
IMM GRANULOCYTES NFR BLD AUTO: 0.3 % (ref 0–0.5)
KETONES UR QL STRIP: NEGATIVE
LEUKOCYTE ESTERASE UR QL STRIP: NEGATIVE
LYMPHOCYTES # BLD AUTO: 3.7 K/UL (ref 1–4.8)
LYMPHOCYTES NFR BLD: 46.6 % (ref 18–48)
MCH RBC QN AUTO: 30.7 PG (ref 27–31)
MCHC RBC AUTO-ENTMCNC: 33.9 G/DL (ref 32–36)
MCV RBC AUTO: 91 FL (ref 82–98)
MONOCYTES # BLD AUTO: 0.5 K/UL (ref 0.3–1)
MONOCYTES NFR BLD: 6.5 % (ref 4–15)
NEUTROPHILS # BLD AUTO: 3.2 K/UL (ref 1.8–7.7)
NEUTROPHILS NFR BLD: 39.7 % (ref 38–73)
NITRITE UR QL STRIP: NEGATIVE
NRBC BLD-RTO: 0 /100 WBC
PH UR STRIP: 7 [PH] (ref 5–8)
PLATELET # BLD AUTO: 196 K/UL (ref 150–450)
PMV BLD AUTO: 11.1 FL (ref 9.2–12.9)
POTASSIUM SERPL-SCNC: 4.2 MMOL/L (ref 3.5–5.1)
PROT SERPL-MCNC: 7.8 G/DL (ref 6–8.4)
PROT UR QL STRIP: NEGATIVE
RBC # BLD AUTO: 4.86 M/UL (ref 4.6–6.2)
SODIUM SERPL-SCNC: 141 MMOL/L (ref 136–145)
SP GR UR STRIP: 1.02 (ref 1–1.03)
URN SPEC COLLECT METH UR: NORMAL
UROBILINOGEN UR STRIP-ACNC: NEGATIVE EU/DL
WBC # BLD AUTO: 8 K/UL (ref 3.9–12.7)

## 2023-06-25 PROCEDURE — 25000003 PHARM REV CODE 250: Performed by: PHYSICIAN ASSISTANT

## 2023-06-25 PROCEDURE — 81003 URINALYSIS AUTO W/O SCOPE: CPT | Performed by: PHYSICIAN ASSISTANT

## 2023-06-25 PROCEDURE — 96361 HYDRATE IV INFUSION ADD-ON: CPT

## 2023-06-25 PROCEDURE — 96374 THER/PROPH/DIAG INJ IV PUSH: CPT

## 2023-06-25 PROCEDURE — 80053 COMPREHEN METABOLIC PANEL: CPT | Performed by: PHYSICIAN ASSISTANT

## 2023-06-25 PROCEDURE — 63600175 PHARM REV CODE 636 W HCPCS: Performed by: PHYSICIAN ASSISTANT

## 2023-06-25 PROCEDURE — 85025 COMPLETE CBC W/AUTO DIFF WBC: CPT | Performed by: PHYSICIAN ASSISTANT

## 2023-06-25 RX ORDER — CETIRIZINE HYDROCHLORIDE 10 MG/1
10 TABLET ORAL DAILY
Qty: 30 TABLET | Refills: 0 | Status: SHIPPED | OUTPATIENT
Start: 2023-06-25 | End: 2023-07-25

## 2023-06-25 RX ORDER — AMOXICILLIN AND CLAVULANATE POTASSIUM 875; 125 MG/1; MG/1
1 TABLET, FILM COATED ORAL 2 TIMES DAILY
Qty: 14 TABLET | Refills: 0 | Status: SHIPPED | OUTPATIENT
Start: 2023-06-25 | End: 2023-07-02

## 2023-06-25 RX ORDER — TETRACAINE HYDROCHLORIDE 5 MG/ML
2 SOLUTION OPHTHALMIC
Status: COMPLETED | OUTPATIENT
Start: 2023-06-25 | End: 2023-06-25

## 2023-06-25 RX ORDER — KETOROLAC TROMETHAMINE 30 MG/ML
15 INJECTION, SOLUTION INTRAMUSCULAR; INTRAVENOUS
Status: COMPLETED | OUTPATIENT
Start: 2023-06-25 | End: 2023-06-25

## 2023-06-25 RX ORDER — IBUPROFEN 600 MG/1
600 TABLET ORAL EVERY 6 HOURS PRN
Qty: 20 TABLET | Refills: 0 | Status: SHIPPED | OUTPATIENT
Start: 2023-06-25 | End: 2023-06-30

## 2023-06-25 RX ORDER — ACETAMINOPHEN 500 MG
500 TABLET ORAL
Status: COMPLETED | OUTPATIENT
Start: 2023-06-25 | End: 2023-06-25

## 2023-06-25 RX ORDER — FLUTICASONE PROPIONATE 50 MCG
1 SPRAY, SUSPENSION (ML) NASAL 2 TIMES DAILY PRN
Qty: 15 G | Refills: 0 | Status: SHIPPED | OUTPATIENT
Start: 2023-06-25 | End: 2023-07-25

## 2023-06-25 RX ORDER — BUTALBITAL, ACETAMINOPHEN AND CAFFEINE 50; 325; 40 MG/1; MG/1; MG/1
1 TABLET ORAL EVERY 4 HOURS PRN
Qty: 15 TABLET | Refills: 0 | Status: SHIPPED | OUTPATIENT
Start: 2023-06-25 | End: 2023-06-28

## 2023-06-25 RX ADMIN — ACETAMINOPHEN 500 MG: 500 TABLET ORAL at 12:06

## 2023-06-25 RX ADMIN — KETOROLAC TROMETHAMINE 15 MG: 30 INJECTION, SOLUTION INTRAMUSCULAR; INTRAVENOUS at 01:06

## 2023-06-25 RX ADMIN — TETRACAINE HYDROCHLORIDE 2 DROP: 5 SOLUTION OPHTHALMIC at 02:06

## 2023-06-25 RX ADMIN — SODIUM CHLORIDE 1000 ML: 9 INJECTION, SOLUTION INTRAVENOUS at 12:06

## 2023-06-25 NOTE — ED PROVIDER NOTES
Encounter Date: 6/24/2023       History     Chief Complaint   Patient presents with    Headache     Pt presents to the ED with c/o intermittent frontal HA x5 days and blurred vision x3 days. Reports recent trip to [place] at the beginning of the month where he had dental work done. Endorses generalized weakness as well. CBG 79 in triage.     CC: Headache      HPI:   33-year-old male no pertinent past medical history presenting for evaluation of 5 day history of intermittent frontal headache w associated photophobia that is temporarily resolved with taking Tylenol. He reports he has had 3 episodes of blurry vision lasting 1 hour that occur from walking from inside to outdoors in the heat.  Cannot recall the last time he had an eye exam. No associated eye pain, itching or discharge. Has had associated generalized weakness and fatigue.  Denies chest pain shortness of breath dizziness or lightheadedness.  Denies any recent strenuous activity, increased stressors, decreased p.o. intake, change in sleep habits, heat exposure.  He reports he has sinus pain and pressure and nasal congestion.  He denies any fever.  Of note, patient recently had crowns placed in [place] from the end of may until 6/6/23.  He denies any headache following the procedure or any persisting dental pain. He denies any personal or family history of headache.  Denies any neck pain, back pain, weakness, paresthesias, confusion, gait or speech difficulty.  Denies any melena, hematochezia, hematuria, hematemesis for history of anemia.    The history is provided by the patient.   Review of patient's allergies indicates:  No Known Allergies  No past medical history on file.  No past surgical history on file.  No family history on file.  Social History     Tobacco Use    Smoking status: Every Day    Smokeless tobacco: Never   Substance Use Topics    Alcohol use: Never    Drug use: Never     Review of Systems   Constitutional:  Negative for chills and fever.    HENT:  Negative for congestion, ear pain, rhinorrhea and sore throat.    Eyes:  Positive for photophobia and visual disturbance. Negative for pain, discharge, redness and itching.   Respiratory:  Negative for shortness of breath and stridor.    Cardiovascular:  Negative for chest pain.   Gastrointestinal:  Negative for abdominal pain, constipation, diarrhea, nausea and vomiting.   Genitourinary:  Negative for dysuria, frequency, hematuria and urgency.   Musculoskeletal:  Negative for back pain and neck pain.   Skin:  Negative for rash.   Neurological:  Negative for dizziness, speech difficulty, weakness, light-headedness and numbness.   Hematological:  Does not bruise/bleed easily.   Psychiatric/Behavioral:  Negative for confusion.      Physical Exam     Initial Vitals [06/24/23 2350]   BP Pulse Resp Temp SpO2   137/87 71 16 98.7 °F (37.1 °C) 97 %      MAP       --         Physical Exam    Nursing note and vitals reviewed.  Constitutional: He appears well-developed and well-nourished.  Non-toxic appearance. He does not have a sickly appearance. No distress.   HENT:   Head: Normocephalic.   Right Ear: Hearing, tympanic membrane, external ear and ear canal normal.   Left Ear: Hearing, tympanic membrane, external ear and ear canal normal.   Nose: Nose normal.   Mouth/Throat: Oropharynx is clear and moist. No trismus in the jaw. No oropharyngeal exudate, posterior oropharyngeal edema or posterior oropharyngeal erythema.   Eyes: Conjunctivae, EOM and lids are normal. Pupils are equal, round, and reactive to light.   IOP 16 95% CI bilaterally      Neck: Neck supple. No tracheal deviation present.   Normal range of motion.  Cardiovascular:  Normal rate and regular rhythm.     Exam reveals no gallop and no friction rub.       No murmur heard.  Pulmonary/Chest: Breath sounds normal. No tachypnea and no bradypnea. No respiratory distress. He has no wheezes. He has no rhonchi. He has no rales.   Abdominal: Abdomen is soft.  Bowel sounds are normal. He exhibits no distension. There is no abdominal tenderness. There is no rebound and no guarding.   Musculoskeletal:      Cervical back: Normal range of motion and neck supple.     Lymphadenopathy:     He has no cervical adenopathy.   Neurological: He is alert and oriented to person, place, and time. He has normal strength. No cranial nerve deficit or sensory deficit. Coordination and gait normal. GCS eye subscore is 4. GCS verbal subscore is 5. GCS motor subscore is 6.   Skin: Skin is warm and dry. No rash noted.   Psychiatric: He has a normal mood and affect. His behavior is normal. Judgment and thought content normal.       ED Course   Procedures  Labs Reviewed   COMPREHENSIVE METABOLIC PANEL - Abnormal; Notable for the following components:       Result Value    ALT 48 (*)     All other components within normal limits   CBC W/ AUTO DIFFERENTIAL   URINALYSIS, REFLEX TO URINE CULTURE    Narrative:     Specimen Source->Urine   POCT GLUCOSE          Imaging Results              CT Head Without Contrast (Final result)  Result time 06/25/23 01:16:28      Final result by Deonte Denton MD (06/25/23 01:16:28)                   Impression:      There is no evidence for acute intracranial process.    Paranasal sinus disease, as above.      Electronically signed by: Deonte Denton  Date:    06/25/2023  Time:    01:16               Narrative:    EXAMINATION:  CT HEAD WITHOUT CONTRAST    CLINICAL HISTORY:  Headache, chronic, new features or increased frequency;    TECHNIQUE:  Low dose axial images were obtained through the head.  Coronal and sagittal reformations were also performed. Contrast was not administered.    COMPARISON:  None.    FINDINGS:  Mild accentuated CSF density along the posterior fossa noted, this may relate to developmental variant, less likely arachnoid cyst.  The ventricular system, sulcal pattern and parenchymal attenuation characteristics otherwise appear appropriate for  age.  There is no additional evidence for intracranial mass, mass effect or midline shift and no evidence for acute intracranial hemorrhage.  Gray-white differentiation appears appropriate.  Appropriate CSF spaces are seen at the skull base, there is no hydrocephalus.    The mastoid air cells appear well aerated.  The visualized orbits appear intact.    There is opacity involving the left frontal sinus that appears to include air-fluid levels.  There is prominent opacity throughout the ethmoid air cells.  There is an air-fluid level at the right frontoethmoid sinus.  There is a viscous appearing area of mucus or fluid seen within the left sphenoid sinus, mild mucosal thickening of the sphenoid sinus is also noted.  Limited imaging of the superior maxillary antra demonstrates minimal mucosal thickening.    The visualized osseous structures appear intact.                                       Medications   sodium chloride 0.9% bolus 1,000 mL 1,000 mL (0 mLs Intravenous Stopped 6/25/23 0222)   acetaminophen tablet 500 mg (500 mg Oral Given 6/25/23 0036)   ketorolac injection 15 mg (15 mg Intravenous Given 6/25/23 0143)   TETRAcaine HCl (PF) 0.5 % Drop 2 drop (2 drops Both Eyes Given 6/25/23 0217)     Medical Decision Making:   Clinical Tests:   Lab Tests: Ordered and Reviewed  Radiological Study: Ordered and Reviewed  ED Management:  presents to ED c/o headache. I believe it is likely secondary to sinusitis vs. migraine    Differential Diagnosis included but was not limited to:  - SAH: not sudden onset or worst headache of life  - epidural/subdural hematoma: no head injury  - CVA: normal neuro exam  - temporal arteritis:  no temporal pain, headache not specifically unilateral  - glaucoma: age inconsistent, eye exam wnl  - meningitis: no neck stifness  - hypoglycemia/hyperglycemia: normal glucose    CT head ordered due to no history of HAs and associated blurry vision  No intracranial bleed or mass. Mildly accentuated  csf density along posterior fossa which may be developmental variant versus arachnoid cyst.  Will refer patient to neurology for further evaluation management.  Pain improved in the ED with IV fluids, IV Toradol and Tylenol p.o..    Instructed to follow up with Neurology for further evaluation management Patient advised to return to the ED for any  new or worsening symptoms, headache unrelieved by pain medications, blurry vision or vomiting. They verbalized their understanding back to me. They were given the opportunity to ask questions, which were reasonably addressed to the best of my ability and their apparent satisfaction. Discussed with Dr. Nava who agrees with assessment and plan.                                       Clinical Impression:   Final diagnoses:  [R51.9] Frontal headache (Primary)  [J01.10] Acute non-recurrent frontal sinusitis        ED Disposition Condition    Discharge Stable          ED Prescriptions       Medication Sig Dispense Start Date End Date Auth. Provider    ibuprofen (ADVIL,MOTRIN) 600 MG tablet Take 1 tablet (600 mg total) by mouth every 6 (six) hours as needed for Pain. 20 tablet 6/25/2023 6/30/2023 Amberly Yusuf PA-C    butalbital-acetaminophen-caffeine -40 mg (FIORICET, ESGIC) -40 mg per tablet Take 1 tablet by mouth every 4 (four) hours as needed for Pain or Headaches. 15 tablet 6/25/2023 6/28/2023 Amberly Yusuf PA-C    fluticasone propionate (FLONASE) 50 mcg/actuation nasal spray 1 spray (50 mcg total) by Each Nostril route 2 (two) times daily as needed for Rhinitis or Allergies. 15 g 6/25/2023 7/25/2023 Amberly Yusuf PA-C    cetirizine (ZYRTEC) 10 MG tablet Take 1 tablet (10 mg total) by mouth once daily. 30 tablet 6/25/2023 7/25/2023 Amberly Yusuf PA-C    amoxicillin-clavulanate 875-125mg (AUGMENTIN) 875-125 mg per tablet Take 1 tablet by mouth 2 (two) times daily. for 7 days 14 tablet 6/25/2023 7/2/2023 Amberly Yusuf  EZRA          Follow-up Information       Follow up With Specialties Details Why Contact Info    Francisco Mcnair MD Neurology Schedule an appointment as soon as possible for a visit in 2 days for follow up with Neurology [address]  [facility] [address] [phone]      [facility] Neurology Neurology Schedule an appointment as soon as possible for a visit in 2 days for follow up [address] [facility] [address] [phone]    [facility] Emergency Medicine Go to  As needed, If symptoms worsen [address] [facility] [address] [phone]             Amberly Yusuf PA-C  06/25/23 1142

## 2023-06-25 NOTE — DISCHARGE INSTRUCTIONS

## 2023-06-25 NOTE — ED NOTES
Pt to ed c/o frontal headache x 5 days and blurred vision x 3 episodes. Currently denies blurry vision.

## 2023-12-26 ENCOUNTER — HOSPITAL ENCOUNTER (EMERGENCY)
Facility: HOSPITAL | Age: 33
Discharge: HOME | End: 2023-12-26
Attending: EMERGENCY MEDICINE
Payer: MEDICAID

## 2023-12-26 VITALS
BODY MASS INDEX: 32.96 KG/M2 | HEIGHT: 67 IN | HEART RATE: 74 BPM | RESPIRATION RATE: 18 BRPM | TEMPERATURE: 98 F | SYSTOLIC BLOOD PRESSURE: 120 MMHG | OXYGEN SATURATION: 100 % | WEIGHT: 210 LBS | DIASTOLIC BLOOD PRESSURE: 75 MMHG

## 2023-12-26 DIAGNOSIS — N20.0 KIDNEY STONE: Primary | ICD-10-CM

## 2023-12-26 LAB
BILIRUB UR QL STRIP: NEGATIVE
CLARITY UR: CLEAR
COLOR UR: YELLOW
GLUCOSE UR QL STRIP: NEGATIVE
HGB UR QL STRIP: ABNORMAL
KETONES UR QL STRIP: NEGATIVE
LEUKOCYTE ESTERASE UR QL STRIP: NEGATIVE
MICROSCOPIC COMMENT: ABNORMAL
NITRITE UR QL STRIP: NEGATIVE
PH UR STRIP: 6 [PH] (ref 5–8)
PROT UR QL STRIP: ABNORMAL
RBC #/AREA URNS HPF: >100 /HPF (ref 0–4)
SP GR UR STRIP: 1.03 (ref 1–1.03)
URN SPEC COLLECT METH UR: ABNORMAL
UROBILINOGEN UR STRIP-ACNC: NEGATIVE EU/DL
WBC #/AREA URNS HPF: 1 /HPF (ref 0–5)

## 2023-12-26 PROCEDURE — 81000 URINALYSIS NONAUTO W/SCOPE: CPT | Performed by: EMERGENCY MEDICINE

## 2023-12-26 PROCEDURE — 63600175 PHARM REV CODE 636 W HCPCS: Performed by: STUDENT IN AN ORGANIZED HEALTH CARE EDUCATION/TRAINING PROGRAM

## 2023-12-26 PROCEDURE — 96372 THER/PROPH/DIAG INJ SC/IM: CPT | Performed by: STUDENT IN AN ORGANIZED HEALTH CARE EDUCATION/TRAINING PROGRAM

## 2023-12-26 PROCEDURE — 99285 EMERGENCY DEPT VISIT HI MDM: CPT | Mod: 25

## 2023-12-26 PROCEDURE — 87491 CHLMYD TRACH DNA AMP PROBE: CPT | Performed by: EMERGENCY MEDICINE

## 2023-12-26 PROCEDURE — 36000 PLACE NEEDLE IN VEIN: CPT

## 2023-12-26 RX ORDER — TAMSULOSIN HYDROCHLORIDE 0.4 MG/1
0.4 CAPSULE ORAL DAILY
Qty: 10 CAPSULE | Refills: 0 | Status: SHIPPED | OUTPATIENT
Start: 2023-12-26 | End: 2024-12-25

## 2023-12-26 RX ORDER — KETOROLAC TROMETHAMINE 30 MG/ML
30 INJECTION, SOLUTION INTRAMUSCULAR; INTRAVENOUS
Status: DISCONTINUED | OUTPATIENT
Start: 2023-12-26 | End: 2023-12-26

## 2023-12-26 RX ORDER — IBUPROFEN 200 MG
400 TABLET ORAL EVERY 6 HOURS PRN
Qty: 30 TABLET | Refills: 0
Start: 2023-12-26

## 2023-12-26 RX ORDER — KETOROLAC TROMETHAMINE 30 MG/ML
30 INJECTION, SOLUTION INTRAMUSCULAR; INTRAVENOUS
Status: COMPLETED | OUTPATIENT
Start: 2023-12-26 | End: 2023-12-26

## 2023-12-26 RX ORDER — PROMETHAZINE HYDROCHLORIDE 25 MG/1
25 TABLET ORAL EVERY 6 HOURS PRN
Qty: 20 TABLET | Refills: 0 | Status: SHIPPED | OUTPATIENT
Start: 2023-12-26

## 2023-12-26 RX ORDER — ACETAMINOPHEN 500 MG
1000 TABLET ORAL 3 TIMES DAILY PRN
Qty: 30 TABLET | Refills: 0
Start: 2023-12-26

## 2023-12-26 RX ORDER — HYDROCODONE BITARTRATE AND ACETAMINOPHEN 5; 325 MG/1; MG/1
1 TABLET ORAL EVERY 6 HOURS PRN
Qty: 12 TABLET | Refills: 0 | Status: SHIPPED | OUTPATIENT
Start: 2023-12-26 | End: 2023-12-29

## 2023-12-26 RX ADMIN — KETOROLAC TROMETHAMINE 30 MG: 30 INJECTION, SOLUTION INTRAMUSCULAR; INTRAVENOUS at 04:12

## 2023-12-26 NOTE — DISCHARGE INSTRUCTIONS
You were evaluated and treated in the emergency department today. You were found to have a diagnoses that can be managed well at home, however that requires your commitment to getting better.   Problem-Specific Instructions:  Take medication as prescribed.  Follow-up with primary care provider.  Consult has been placed for Urology.  Return to the emergency room for any new or worsening symptoms.  Return for fever..      Ensure you follow up with your Primary Care Provider or any additional providers listed on this discharge sheet. While you may be healthy enough to go home today, I cannot predict the exact course of your diagnoses. As such, it is your responsibility to monitor symptoms, follow-up with another healthcare provider, or return to the emergency room for new or worsening concerns. Unless otherwise instructed, continue all home medications and any new medications prescribed to you in the Emergency Department.   General Maintenance: Ensure adequate hydration to prevent prolonged illness and recovery. Monitor your caloric intake with a goal of obtaining and maintaining a healthy weight to help prevent the development of chronic and life-threatening medical conditions. Start healthy fitness habits and aim for a goal of 30 minutes to an hour of exercise 3-5 times a week. Avoid the use of tobacco, alcohol, and illicit drugs as these may be detrimental to your health goals.

## 2023-12-27 LAB
C TRACH DNA SPEC QL NAA+PROBE: NOT DETECTED
N GONORRHOEA DNA SPEC QL NAA+PROBE: NOT DETECTED

## 2024-10-14 ENCOUNTER — LAB VISIT (OUTPATIENT)
Dept: LAB | Facility: HOSPITAL | Age: 34
End: 2024-10-14
Payer: MEDICAID

## 2024-10-14 ENCOUNTER — OFFICE VISIT (OUTPATIENT)
Facility: CLINIC | Age: 34
End: 2024-10-14
Payer: MEDICAID

## 2024-10-14 ENCOUNTER — PATIENT MESSAGE (OUTPATIENT)
Facility: CLINIC | Age: 34
End: 2024-10-14

## 2024-10-14 VITALS
BODY MASS INDEX: 29.12 KG/M2 | OXYGEN SATURATION: 98 % | SYSTOLIC BLOOD PRESSURE: 116 MMHG | WEIGHT: 219.69 LBS | DIASTOLIC BLOOD PRESSURE: 74 MMHG | RESPIRATION RATE: 18 BRPM | TEMPERATURE: 97 F | HEIGHT: 73 IN | HEART RATE: 64 BPM

## 2024-10-14 DIAGNOSIS — Z00.00 ANNUAL PHYSICAL EXAM: Primary | ICD-10-CM

## 2024-10-14 DIAGNOSIS — J06.9 UPPER RESPIRATORY TRACT INFECTION, UNSPECIFIED TYPE: ICD-10-CM

## 2024-10-14 DIAGNOSIS — Z13.84 ENCOUNTER FOR SCREENING FOR DENTAL DISORDER: ICD-10-CM

## 2024-10-14 DIAGNOSIS — Z00.00 ANNUAL PHYSICAL EXAM: ICD-10-CM

## 2024-10-14 LAB
ALBUMIN SERPL BCP-MCNC: 4 G/DL (ref 3.5–5.2)
ALP SERPL-CCNC: 65 U/L (ref 55–135)
ALT SERPL W/O P-5'-P-CCNC: 58 U/L (ref 10–44)
ANION GAP SERPL CALC-SCNC: 9 MMOL/L (ref 8–16)
AST SERPL-CCNC: 29 U/L (ref 10–40)
BASOPHILS # BLD AUTO: 0.06 K/UL (ref 0–0.2)
BASOPHILS NFR BLD: 0.7 % (ref 0–1.9)
BILIRUB SERPL-MCNC: 0.3 MG/DL (ref 0.1–1)
BUN SERPL-MCNC: 12 MG/DL (ref 6–20)
CALCIUM SERPL-MCNC: 9.4 MG/DL (ref 8.7–10.5)
CHLORIDE SERPL-SCNC: 104 MMOL/L (ref 95–110)
CHOLEST SERPL-MCNC: 328 MG/DL (ref 120–199)
CHOLEST/HDLC SERPL: 8.4 {RATIO} (ref 2–5)
CO2 SERPL-SCNC: 25 MMOL/L (ref 23–29)
CREAT SERPL-MCNC: 0.8 MG/DL (ref 0.5–1.4)
DIFFERENTIAL METHOD BLD: ABNORMAL
EOSINOPHIL # BLD AUTO: 0.5 K/UL (ref 0–0.5)
EOSINOPHIL NFR BLD: 5.4 % (ref 0–8)
ERYTHROCYTE [DISTWIDTH] IN BLOOD BY AUTOMATED COUNT: 12.4 % (ref 11.5–14.5)
EST. GFR  (NO RACE VARIABLE): >60 ML/MIN/1.73 M^2
ESTIMATED AVG GLUCOSE: 100 MG/DL (ref 68–131)
GLUCOSE SERPL-MCNC: 90 MG/DL (ref 70–110)
HBA1C MFR BLD: 5.1 % (ref 4–5.6)
HCT VFR BLD AUTO: 48.7 % (ref 40–54)
HCV AB SERPL QL IA: NORMAL
HDLC SERPL-MCNC: 39 MG/DL (ref 40–75)
HDLC SERPL: 11.9 % (ref 20–50)
HGB BLD-MCNC: 16.2 G/DL (ref 14–18)
HIV 1+2 AB+HIV1 P24 AG SERPL QL IA: NORMAL
IMM GRANULOCYTES # BLD AUTO: 0.04 K/UL (ref 0–0.04)
IMM GRANULOCYTES NFR BLD AUTO: 0.4 % (ref 0–0.5)
LDLC SERPL CALC-MCNC: 245.8 MG/DL (ref 63–159)
LYMPHOCYTES # BLD AUTO: 3.2 K/UL (ref 1–4.8)
LYMPHOCYTES NFR BLD: 35.5 % (ref 18–48)
MCH RBC QN AUTO: 31.6 PG (ref 27–31)
MCHC RBC AUTO-ENTMCNC: 33.3 G/DL (ref 32–36)
MCV RBC AUTO: 95 FL (ref 82–98)
MONOCYTES # BLD AUTO: 0.8 K/UL (ref 0.3–1)
MONOCYTES NFR BLD: 9 % (ref 4–15)
NEUTROPHILS # BLD AUTO: 4.4 K/UL (ref 1.8–7.7)
NEUTROPHILS NFR BLD: 49 % (ref 38–73)
NONHDLC SERPL-MCNC: 289 MG/DL
NRBC BLD-RTO: 0 /100 WBC
PLATELET # BLD AUTO: 195 K/UL (ref 150–450)
PMV BLD AUTO: 11.9 FL (ref 9.2–12.9)
POTASSIUM SERPL-SCNC: 4.6 MMOL/L (ref 3.5–5.1)
PROT SERPL-MCNC: 7.6 G/DL (ref 6–8.4)
RBC # BLD AUTO: 5.13 M/UL (ref 4.6–6.2)
SODIUM SERPL-SCNC: 138 MMOL/L (ref 136–145)
TRIGL SERPL-MCNC: 216 MG/DL (ref 30–150)
TSH SERPL DL<=0.005 MIU/L-ACNC: 1.37 UIU/ML (ref 0.4–4)
WBC # BLD AUTO: 8.89 K/UL (ref 3.9–12.7)

## 2024-10-14 PROCEDURE — 3078F DIAST BP <80 MM HG: CPT | Mod: CPTII,,,

## 2024-10-14 PROCEDURE — 99385 PREV VISIT NEW AGE 18-39: CPT | Mod: S$PBB,1E,GY,

## 2024-10-14 PROCEDURE — 83036 HEMOGLOBIN GLYCOSYLATED A1C: CPT

## 2024-10-14 PROCEDURE — 99999 PR PBB SHADOW E&M-EST. PATIENT-LVL IV: CPT | Mod: PBBFAC,,,

## 2024-10-14 PROCEDURE — 3008F BODY MASS INDEX DOCD: CPT | Mod: CPTII,,,

## 2024-10-14 PROCEDURE — 85025 COMPLETE CBC W/AUTO DIFF WBC: CPT

## 2024-10-14 PROCEDURE — 36415 COLL VENOUS BLD VENIPUNCTURE: CPT

## 2024-10-14 PROCEDURE — 1159F MED LIST DOCD IN RCRD: CPT | Mod: CPTII,,,

## 2024-10-14 PROCEDURE — 80053 COMPREHEN METABOLIC PANEL: CPT

## 2024-10-14 PROCEDURE — 84443 ASSAY THYROID STIM HORMONE: CPT

## 2024-10-14 PROCEDURE — 1160F RVW MEDS BY RX/DR IN RCRD: CPT | Mod: CPTII,,,

## 2024-10-14 PROCEDURE — 99214 OFFICE O/P EST MOD 30 MIN: CPT | Mod: PBBFAC,PN

## 2024-10-14 PROCEDURE — 87389 HIV-1 AG W/HIV-1&-2 AB AG IA: CPT

## 2024-10-14 PROCEDURE — 3044F HG A1C LEVEL LT 7.0%: CPT | Mod: CPTII,,,

## 2024-10-14 PROCEDURE — 80061 LIPID PANEL: CPT

## 2024-10-14 PROCEDURE — 99213 OFFICE O/P EST LOW 20 MIN: CPT | Mod: S$PBB,25,,

## 2024-10-14 PROCEDURE — 3074F SYST BP LT 130 MM HG: CPT | Mod: CPTII,,,

## 2024-10-14 PROCEDURE — 86803 HEPATITIS C AB TEST: CPT

## 2024-10-14 RX ORDER — FLUTICASONE PROPIONATE 50 MCG
1 SPRAY, SUSPENSION (ML) NASAL DAILY
Qty: 18.2 ML | Refills: 0 | Status: SHIPPED | OUTPATIENT
Start: 2024-10-14 | End: 2024-11-13

## 2024-10-14 RX ORDER — CETIRIZINE HYDROCHLORIDE 10 MG/1
10 TABLET ORAL DAILY
Qty: 30 TABLET | Refills: 0 | Status: SHIPPED | OUTPATIENT
Start: 2024-10-14 | End: 2024-11-13

## 2024-10-14 RX ORDER — AZELASTINE 1 MG/ML
1 SPRAY, METERED NASAL 2 TIMES DAILY
COMMUNITY
Start: 2024-09-15

## 2024-10-14 NOTE — PROGRESS NOTES
SUBJECTIVE     Chief Complaint   Patient presents with    Establish Care     Pt states he want to establish care with pcp       JORDI  Amaury Hair is a 34 y.o. male with multiple medical diagnoses as listed in the medical history and problem list that presents for annual exam and to establish care. Pt is new to me and to the clinic.  Pt has been doing pretty good overall and denies any recent hospitalizations.     Pt reports,  He has a good appetite and eats pretty well. He does exercise. He sleeps for ~7-8  hours nightly. Pt does not take OTC supplements. Pt is UTD on age appropriate CA screening.    Pt also complains today about upper respiratory symptoms that has been going on for a short while. Pt states he has had some cough and congestion but denies any fever, chills or body aches at this time.     Exercise dailu   No OTC   PAST MEDICAL HISTORY:  History reviewed. No pertinent past medical history.    PAST SURGICAL HISTORY:  History reviewed. No pertinent surgical history.    SOCIAL HISTORY:  Social History     Socioeconomic History    Marital status: Single   Tobacco Use    Smoking status: Every Day     Current packs/day: 1.00     Average packs/day: 1 pack/day for 15.0 years (15.0 ttl pk-yrs)     Types: Cigarettes     Passive exposure: Current    Smokeless tobacco: Never   Substance and Sexual Activity    Alcohol use: Never    Drug use: Never    Sexual activity: Yes     Partners: Female       FAMILY HISTORY:  Family History   Problem Relation Name Age of Onset    No Known Problems Mother      Cancer Father          colon cancer    No Known Problems Sister      No Known Problems Brother      No Known Problems Maternal Grandmother      No Known Problems Maternal Grandfather      No Known Problems Paternal Grandmother      No Known Problems Paternal Grandfather      No Known Problems Daughter      No Known Problems Son      No Known Problems Maternal Aunt      No Known Problems Maternal Uncle      No Known  Problems Paternal Aunt      No Known Problems Paternal Uncle      No Known Problems Maternal Cousin      No Known Problems Paternal Cousin      No Known Problems Other         ALLERGIES AND MEDICATIONS: updated and reviewed.  Review of patient's allergies indicates:  No Known Allergies  Current Outpatient Medications   Medication Sig Dispense Refill    acetaminophen (TYLENOL) 500 MG tablet Take 2 tablets (1,000 mg total) by mouth 3 (three) times daily as needed for Pain. 30 tablet 0    azelastine (ASTELIN) 137 mcg (0.1 %) nasal spray 1 spray 2 (two) times daily.      ibuprofen (ADVIL,MOTRIN) 200 MG tablet Take 2 tablets (400 mg total) by mouth every 6 (six) hours as needed for Pain. 30 tablet 0    tamsulosin (FLOMAX) 0.4 mg Cap Take 1 capsule (0.4 mg total) by mouth once daily. 10 capsule 0    cetirizine (ZYRTEC) 10 MG tablet Take 1 tablet (10 mg total) by mouth once daily. 30 tablet 0    fluticasone propionate (FLONASE) 50 mcg/actuation nasal spray 1 spray (50 mcg total) by Each Nostril route once daily. 18.2 mL 0    promethazine (PHENERGAN) 25 MG tablet Take 1 tablet (25 mg total) by mouth every 6 (six) hours as needed for Nausea. (Patient not taking: Reported on 10/14/2024) 20 tablet 0     No current facility-administered medications for this visit.       ROS  Review of Systems   Constitutional:  Negative for appetite change, chills, diaphoresis, fatigue, fever and unexpected weight change.   HENT:  Positive for congestion, postnasal drip and sinus pressure. Negative for ear pain, rhinorrhea, sinus pain and sore throat.    Eyes:  Negative for pain and redness.   Respiratory:  Positive for cough. Negative for choking, chest tightness and shortness of breath.    Cardiovascular:  Negative for chest pain, palpitations and leg swelling.   Gastrointestinal:  Negative for abdominal pain, constipation, diarrhea, nausea and vomiting.   Endocrine: Negative for cold intolerance and heat intolerance.   Genitourinary:   "Negative for difficulty urinating, dysuria, flank pain and urgency.   Musculoskeletal:  Negative for back pain, neck pain and neck stiffness.   Skin:  Negative for color change, pallor and rash.   Allergic/Immunologic: Negative for food allergies.   Neurological:  Negative for dizziness, tremors, weakness, light-headedness and headaches.   Psychiatric/Behavioral:  Negative for agitation and confusion. The patient is not nervous/anxious.          OBJECTIVE     Physical Exam  Vitals:    10/14/24 0841   BP: 116/74   Pulse: 64   Resp: 18   Temp: 96.8 °F (36 °C)    Body mass index is 28.98 kg/m².  Weight: 99.6 kg (219 lb 11 oz)   Height: 6' 1" (185.4 cm)     Physical Exam  Constitutional:       General: He is not in acute distress.     Appearance: Normal appearance. He is not ill-appearing or diaphoretic.   HENT:      Right Ear: Tympanic membrane normal. There is no impacted cerumen.      Left Ear: Tympanic membrane normal. There is no impacted cerumen.      Nose: Nose normal. No congestion or rhinorrhea.      Mouth/Throat:      Mouth: Mucous membranes are moist.      Pharynx: Oropharynx is clear. No oropharyngeal exudate or posterior oropharyngeal erythema.   Eyes:      General:         Right eye: No discharge.         Left eye: No discharge.   Cardiovascular:      Rate and Rhythm: Normal rate and regular rhythm.      Pulses: Normal pulses.      Heart sounds: Normal heart sounds.   Pulmonary:      Effort: Pulmonary effort is normal.      Breath sounds: Normal breath sounds.   Abdominal:      General: Bowel sounds are normal. There is no distension.      Palpations: Abdomen is soft.      Tenderness: There is no abdominal tenderness. There is no guarding.   Musculoskeletal:         General: No swelling or tenderness.      Cervical back: Normal range of motion. No rigidity or tenderness.      Right lower leg: No edema.      Left lower leg: No edema.   Skin:     General: Skin is warm and dry.      Findings: No bruising, " erythema, lesion or rash.   Neurological:      Mental Status: He is alert and oriented to person, place, and time.      Motor: No weakness.      Gait: Gait normal.   Psychiatric:         Mood and Affect: Mood normal.         Behavior: Behavior normal.           Health Maintenance         Date Due Completion Date    Pneumococcal Vaccines (Age 0-64) (1 of 2 - PCV) Never done ---    TETANUS VACCINE Never done ---    High Dose Statin Never done ---    Influenza Vaccine (1) Never done ---    COVID-19 Vaccine (4 - 2024-25 season) 09/01/2024 1/11/2022    Hemoglobin A1c (Diabetic Prevention Screening) 10/14/2027 10/14/2024    Lipid Panel 10/14/2029 10/14/2024    RSV Vaccine (Age 60+ and Pregnant patients) (1 - 1-dose 75+ series) 02/17/2064 ---              ASSESSMENT     34 y.o. male with     1. Annual physical exam    2. Encounter for screening for dental disorder    3. Upper respiratory tract infection, unspecified type        PLAN:     1. Annual physical exam  Counseled on age appropriate medical preventative services, including age appropriate cancer screenings, over all nutritional health, need for a consistent exercise regimen and an over all push towards maintaining a vigorous and active lifestyle.  Counseled on age appropriate vaccines and discussed upcoming health care needs based on age/gender.  Spent time with patient counseling on need for a good patient/provider relationship moving forward.  Discussed use of common OTC medications and supplements.  Discussed common dietary aids and use of caffeine and the need for good sleep hygiene and stress management.  - CBC Auto Differential; Future  - Comprehensive Metabolic Panel; Future  - Hemoglobin A1C; Future  - Lipid Panel; Future  - TSH; Future  - HEPATITIS C ANTIBODY; Future  - HIV 1/2 Ag/Ab (4th Gen); Future    2. Encounter for screening for dental disorder  Oral health screening completed at today's visit. All questions/concerns addressed.     3. Upper  respiratory tract infection, unspecified type  New; Treating   -AFVSS in clinic today.  -Mild symptoms, most likely viral etiology.  -based on clinical findings today, does not warrant Abx treatment at this time. D/w pt about the potential risks of inappropriate Abx use and that if patient's Sx worsens, we will re-evaluate to assess the need to change the treatment plan.    -Warm tea with honey and lemon, and/or warm salt water gargles every 4 hours PRN for sore throat. May try OTC Cepacol if pt desires.  -advised frequent hand washing, rest, and plenty of fluids. Increase water intake to 64-80 oz daily to help thin mucus.  -Tylenol tablets as needed for fever, headaches, sore throat, ear pain, bodyaches, and/or nasal/sinus inflammation.   -Nasal Saline spray (Over the counter Dewey spray or Ayr)  2 sprays each nostril 2-3 times a day for nasal congestion.     I counseled the patient on fluids, rest, OTC medications that can safely be used, hand/cough hygiene, expected course of illness, and when further medical attention would be warranted.      Discussed DDx, condition, and treatment.   ED precautions given.   Notify provider if symptoms do not resolve or increase in severity.   Patient verbalizes understanding and agrees with plan of care.      - fluticasone propionate (FLONASE) 50 mcg/actuation nasal spray; 1 spray (50 mcg total) by Each Nostril route once daily.  Dispense: 18.2 mL; Refill: 0  - cetirizine (ZYRTEC) 10 MG tablet; Take 1 tablet (10 mg total) by mouth once daily.  Dispense: 30 tablet; Refill: 0      RTC in 2 months or sooner should symptoms worsen or fail to improve     Chandler Fischer, DIVYA, APRN, FNP-BC  [facility]   10/19/2024 9:14 AM        Follow up in about 2 months (around 12/14/2024).

## 2024-11-07 ENCOUNTER — PATIENT MESSAGE (OUTPATIENT)
Facility: CLINIC | Age: 34
End: 2024-11-07
Payer: MEDICAID

## 2025-01-21 ENCOUNTER — OFFICE VISIT (OUTPATIENT)
Facility: CLINIC | Age: 35
End: 2025-01-21
Payer: MEDICAID

## 2025-01-21 DIAGNOSIS — E78.5 HYPERLIPIDEMIA, UNSPECIFIED HYPERLIPIDEMIA TYPE: ICD-10-CM

## 2025-01-21 DIAGNOSIS — Z71.2 ENCOUNTER TO DISCUSS TEST RESULTS: ICD-10-CM

## 2025-01-21 DIAGNOSIS — J06.9 UPPER RESPIRATORY TRACT INFECTION, UNSPECIFIED TYPE: Primary | ICD-10-CM

## 2025-01-21 RX ORDER — ALBUTEROL SULFATE 90 UG/1
2 INHALANT RESPIRATORY (INHALATION) EVERY 6 HOURS PRN
Qty: 18 G | Refills: 0 | Status: SHIPPED | OUTPATIENT
Start: 2025-01-21 | End: 2026-01-21

## 2025-01-21 RX ORDER — BENZONATATE 200 MG/1
200 CAPSULE ORAL 3 TIMES DAILY PRN
Qty: 30 CAPSULE | Refills: 0 | Status: SHIPPED | OUTPATIENT
Start: 2025-01-21 | End: 2025-01-31

## 2025-01-21 RX ORDER — ATORVASTATIN CALCIUM 20 MG/1
20 TABLET, FILM COATED ORAL DAILY
Qty: 30 TABLET | Refills: 2 | Status: SHIPPED | OUTPATIENT
Start: 2025-01-21 | End: 2025-04-21

## 2025-01-23 NOTE — PROGRESS NOTES
The patient's location is:  Patient's Home   The chief complaint leading to consultation is:  Upper respiratory tract infection, unspecified type [J06.9]    Visit type: audiovisual    Time spent in discussion with the patient: 19  35 minutes of total time spent on the encounter. This includes time spent in discussion with the patient and time preparing for the patient appointment: review of tests, obtaining and/or reviewing separately obtained history, documenting clinical information in the electronic or other health record, independently interpreting results (not separately reported), and communicating results to the patient/family/caregiver or care coordination (not separately reported).     Each patient to whom he or she provides medical services by telemedicine is: (1) informed of the relationship between the physician and patient and the respective role of any other health care provider with respect to management of the patient; and (2) notified that he or she may decline to receive medical services by telemedicine and may withdraw from such care at any time.      Subjective:   Amaury Hair is a 34 y.o. male who presents for Upper respiratory tract infection, unspecified type [J06.9].       HPI  History of Present Illness    CHIEF COMPLAINT:  Mr. Hair presents today with congestion and headaches and follow up.    RESPIRATORY:  He reports experiencing sticky mucus in the morning. He previously experienced severe coughing episodes that led to vomiting, but denies current morning coughs, noting improvement in this symptom. He is a current smoker, consuming approximately one pack of cigarettes per day.    MUSCULOSKELETAL:  He reports significant improvement in calf pain with magnesium supplementation.    CURRENT MEDICATIONS:  He currently takes Flonase and magnesium supplements.    LABS:  Total cholesterol was 330 with LDL of 245. Metabolic panel showed normal kidney and liver function. Thyroid function tests  were normal. Diabetes screening showed no risk factors for diabetes.      ROS:  General: -fever, -chills, -fatigue, -weight gain, -weight loss  Eyes: -vision changes, -redness, -discharge  ENT: -ear pain, +nasal congestion, -sore throat, +nasal discharge  Cardiovascular: -chest pain, -palpitations, -lower extremity edema  Respiratory: -cough, -shortness of breath  Gastrointestinal: -abdominal pain, -nausea, -vomiting, -diarrhea, -constipation, -blood in stool  Genitourinary: -dysuria, -hematuria, -frequency  Musculoskeletal: -joint pain, +muscle pain  Skin: -rash, -lesion  Neurological: +headache, -dizziness, -numbness, -tingling  Psychiatric: -anxiety, -depression, -sleep difficulty          Review of Systems      Current Outpatient Medications   Medication Instructions    acetaminophen (TYLENOL) 1,000 mg, Oral, 3 times daily PRN    albuterol (VENTOLIN HFA) 90 mcg/actuation inhaler 2 puffs, Inhalation, Every 6 hours PRN, Rescue    atorvastatin (LIPITOR) 20 mg, Oral, Daily    azelastine (ASTELIN) 137 mcg (0.1 %) nasal spray 1 spray, 2 times daily    benzonatate (TESSALON) 200 mg, Oral, 3 times daily PRN    cetirizine (ZYRTEC) 10 mg, Oral, Daily    ibuprofen (ADVIL,MOTRIN) 400 mg, Oral, Every 6 hours PRN    promethazine (PHENERGAN) 25 mg, Oral, Every 6 hours PRN    tamsulosin (FLOMAX) 0.4 mg, Oral, Daily       Objective:   No home vitals reported.     Physical Exam      Assessment/Plan:        Diagnoses and all orders for this visit:    Upper respiratory tract infection, unspecified type  New; Treating  -     albuterol (VENTOLIN HFA) 90 mcg/actuation inhaler; Inhale 2 puffs into the lungs every 6 (six) hours as needed for Wheezing. Rescue  -     benzonatate (TESSALON) 200 MG capsule; Take 1 capsule (200 mg total) by mouth 3 (three) times daily as needed for Cough.    Encounter to discuss test results       New; Discussed    Hyperlipidemia, unspecified hyperlipidemia type  Uncontrolled; Treating  -     atorvastatin  (LIPITOR) 20 MG tablet; Take 1 tablet (20 mg total) by mouth once daily.    Assessment & Plan    IMPRESSION:  - Assessed patient's upper respiratory symptoms and sinus congestion; determined likely cause of headache  - Evaluated patient's elevated cholesterol levels (total cholesterol 330, )  - Considered patient's preference for non-pharmacological management of hyperlipidemia; agreed to reassess in 3 months  - Noted normal kidney and liver function, no diabetes risk based on recent lab results    UPPER RESPIRATORY INFECTION:  - Assessed the patient's condition as an upper respiratory infection with sinus headache and post-nasal drip.  - Noted the patient's reports of congestion, headache, sticky mucus in the morning, and previous coughing that led to vomiting.  - Acknowledged the effectiveness of Flonase and discussed the need for additional medication.  - Prescribed Flonase to be used every morning.  - Prescribed Cetirizine to be taken in the evening.  - Prescribed albuterol inhaler: 2 puffs every 6 hours as needed for wheezing.  - Prescribed cough tablets to be taken 3 times daily.  - Advised on the importance of staying hydrated to keep mucus thin during respiratory infection.  - Instructed the patient to increase fluid intake, especially water.  - Recommend drinking hot tea with fresh lemon and honey in the evenings.  - Advised the patient to gargle with warm salt water for sore throat relief.    HYPERLIPIDEMIA:  - Evaluated the patient's cholesterol levels as extremely high, with total cholesterol at 330 and LDL at 245, putting the patient at high risk for stroke or heart attack.  - Emphasized the importance of lowering cholesterol to reduce risk of stroke and heart attack.  - Discussed how cholesterol buildup in blood vessels can lead to cardiovascular events.  - Educated on the benefits of omega-3 supplements for improving cholesterol profile.  - Prescribed omega-3 fish oil supplement.  - Continued  magnesium supplement.  - Recommend dietary changes, including reducing saturated fats and increasing intake of salmon, mackerel, and avocado.  - Advised increasing exercise.  - Ordered a cholesterol panel for 3 months from now.  - Mr. Hair to reduce intake of saturated fats and fried foods.  - Recommend increasing consumption of foods elevated in omega-3 fatty acids (e.g., fresh salmon, mackerel, avocado).  - Mr. Hair to increase cardio exercise to help burn cholesterol.    SMOKING:  - Noted that the patient smokes about a pack of cigarettes a day.  - Evaluated that smoking is contributing to the patient's respiratory issues and high cholesterol.  - Acknowledged the difficulty in quitting smoking but emphasized its importance for overall health.  - Recommend reducing cigarette smoking, aiming for half a pack per day.  - Suggested a referral for a smoking cessation program, including options for nicotine gum or patches.    FOLLOW UP:  - Instructed the patient to contact the office if respiratory symptoms worsen, wheezing increases, or any other concerns arise before the scheduled follow-up.  - Scheduled follow up in 3 months to reassess cholesterol levels and overall health status.       Chandler Fischer DNP, FNP-BC

## 2025-01-28 ENCOUNTER — PATIENT MESSAGE (OUTPATIENT)
Facility: CLINIC | Age: 35
End: 2025-01-28
Payer: MEDICAID

## 2025-02-04 ENCOUNTER — ON-DEMAND VIRTUAL (OUTPATIENT)
Dept: URGENT CARE | Facility: CLINIC | Age: 35
End: 2025-02-04
Payer: MEDICAID

## 2025-02-04 DIAGNOSIS — J40 BRONCHITIS: Primary | ICD-10-CM

## 2025-02-04 PROCEDURE — 98005 SYNCH AUDIO-VIDEO EST LOW 20: CPT | Mod: 95,,, | Performed by: NURSE PRACTITIONER

## 2025-02-04 RX ORDER — PREDNISONE 20 MG/1
20 TABLET ORAL DAILY
Qty: 3 TABLET | Refills: 0 | Status: SHIPPED | OUTPATIENT
Start: 2025-02-04 | End: 2025-02-07

## 2025-02-05 NOTE — PROGRESS NOTES
Subjective:      Patient ID: Amaury Hair is a 34 y.o. male.    Vitals:  vitals were not taken for this visit.     Chief Complaint: Cough      Visit Type: TELE AUDIOVISUAL - This visit was conducted virtually based on  subjective information and limited objective exam    Present with the patient at the time of consultation: TELEMED PRESENT WITH PATIENT: None  LOCATION OF PATIENT [place]  Two patient identifiers used to verify patient- saying out date of birth and full name.       History reviewed. No pertinent past medical history.  History reviewed. No pertinent surgical history.  Review of patient's allergies indicates:  No Known Allergies  Current Outpatient Medications on File Prior to Visit   Medication Sig Dispense Refill    acetaminophen (TYLENOL) 500 MG tablet Take 2 tablets (1,000 mg total) by mouth 3 (three) times daily as needed for Pain. 30 tablet 0    albuterol (VENTOLIN HFA) 90 mcg/actuation inhaler Inhale 2 puffs into the lungs every 6 (six) hours as needed for Wheezing. Rescue 18 g 0    atorvastatin (LIPITOR) 20 MG tablet Take 1 tablet (20 mg total) by mouth once daily. 30 tablet 2    azelastine (ASTELIN) 137 mcg (0.1 %) nasal spray 1 spray 2 (two) times daily.      cetirizine (ZYRTEC) 10 MG tablet Take 1 tablet (10 mg total) by mouth once daily. 30 tablet 0    ibuprofen (ADVIL,MOTRIN) 200 MG tablet Take 2 tablets (400 mg total) by mouth every 6 (six) hours as needed for Pain. 30 tablet 0    promethazine (PHENERGAN) 25 MG tablet Take 1 tablet (25 mg total) by mouth every 6 (six) hours as needed for Nausea. (Patient not taking: Reported on 10/14/2024) 20 tablet 0    tamsulosin (FLOMAX) 0.4 mg Cap Take 1 capsule (0.4 mg total) by mouth once daily. 10 capsule 0     No current facility-administered medications on file prior to visit.     Family History   Problem Relation Name Age of Onset    No Known Problems Mother      Cancer Father          colon cancer    No Known Problems Sister      No  Known Problems Brother      No Known Problems Maternal Grandmother      No Known Problems Maternal Grandfather      No Known Problems Paternal Grandmother      No Known Problems Paternal Grandfather      No Known Problems Daughter      No Known Problems Son      No Known Problems Maternal Aunt      No Known Problems Maternal Uncle      No Known Problems Paternal Aunt      No Known Problems Paternal Uncle      No Known Problems Maternal Cousin      No Known Problems Paternal Cousin      No Known Problems Other         Medications Ordered                "[id number] - [address] - [address] [facility] [place] [address] [facility]   [address] [address]    Telephone: [phone]   Fax: [phone]   Hours: Not open 24 hours                         E-Prescribed (1 of 1)              predniSONE (DELTASONE) 20 MG tablet    Sig: Take 1 tablet (20 mg total) by mouth once daily. for 3 days       Start: 2/4/25     Quantity: 3 tablet Refills: 0                           Ohs Peq Odvv Intake    2/4/2025  6:21 PM CST - Filed by Patient   What is your current physical address in the event of a medical emergency?    Are you able to take your vital signs? No   Please attach any relevant images or files    Is your employer contracted with [facility]? No         35 yo male with c/o chest congestion for one week. He denies fever. He states thick secretions. He states he has headache in the morning. He states he uses a spray to help with the symptoms. He denies sob and has had wheezing. He states he was given albuterol inhaler         Constitution: Negative.   HENT: Negative.     Cardiovascular: Negative.    Eyes: Negative.    Respiratory:  Positive for cough and shortness of breath. Negative for bloody sputum.    Gastrointestinal: Negative.  Negative for bowel incontinence.   Endocrine: negative.   Genitourinary: Negative.  Negative for dysuria, flank pain, bladder incontinence and pelvic pain.    Musculoskeletal: Negative.  Negative for pain, abnormal ROM of joint and back pain.   Skin: Negative.    Allergic/Immunologic: Negative.    Neurological: Negative.    Hematologic/Lymphatic: Negative.    Psychiatric/Behavioral: Negative.          Objective:   The physical exam was conducted virtually.    AAO x 3 ; no acute distress noted; appearance normal; mood and behavior normal; thought process normal  Head- normocephalic  Nose- appears normal, no discharge or erythema  Eyes- pupils appear normal in size, no drainage, no erythema  Ears- normal appearing; no discharge, no erythema  Mouth- appears normal  Oropharynx- no erythema, lesions  Lungs- breathing at a normal rate, no acute distress noted  Heart- no reports of tachycardia, palpitations, chest pain  Abdomen- non distended, non tender reported by patient  Skin- warm and dry, no erythema or edema noted by patient or visualized  Psych- as above; no si/hi      Assessment:     1. Bronchitis        Plan:   Advised urgent care for chest xray.         Thank you for choosing [facility]!    Our goal in the [facility] is to always provide outstanding medical care. You may receive a survey by mail or e-mail in the next week regarding your experience today. We would greatly appreciate you completing and returning the survey. Your feedback provides us with a way to recognize our staff who provide very good care, and it helps us learn how to improve when your experience was below our aspiration of excellence.         We appreciate you trusting us with your medical care. We hope you feel better soon. We will be happy to take care of you for all of your future medical needs.    You must understand that you've received an Urgent Care treatment only and that you may be released before all your medical problems are known or treated. You, the patient, will arrange for follow up care as instructed.    Follow up with your PCP or specialty  clinic as directed in the next 1-2 weeks if not improved or as needed.  You can call [phone] to schedule an appointment with the appropriate provider.    If your condition worsens we recommend that you receive another evaluation in person, with your primary care provider, urgent care or at the emergency room immediately or contact your primary medical clinics after hours call service to discuss your concerns.         Bronchitis  -     predniSONE (DELTASONE) 20 MG tablet; Take 1 tablet (20 mg total) by mouth once daily. for 3 days  Dispense: 3 tablet; Refill: 0

## 2025-03-13 ENCOUNTER — PATIENT MESSAGE (OUTPATIENT)
Facility: CLINIC | Age: 35
End: 2025-03-13

## 2025-04-12 ENCOUNTER — HOSPITAL ENCOUNTER (EMERGENCY)
Facility: HOSPITAL | Age: 35
Discharge: HOME | End: 2025-04-12
Attending: STUDENT IN AN ORGANIZED HEALTH CARE EDUCATION/TRAINING PROGRAM
Payer: MEDICAID

## 2025-04-12 VITALS
RESPIRATION RATE: 20 BRPM | BODY MASS INDEX: 33.04 KG/M2 | SYSTOLIC BLOOD PRESSURE: 125 MMHG | WEIGHT: 218 LBS | HEART RATE: 81 BPM | TEMPERATURE: 98 F | OXYGEN SATURATION: 98 % | HEIGHT: 68 IN | DIASTOLIC BLOOD PRESSURE: 77 MMHG

## 2025-04-12 DIAGNOSIS — N20.1 URETEROLITHIASIS: Primary | ICD-10-CM

## 2025-04-12 LAB
ABSOLUTE EOSINOPHIL (OHS): 0.45 K/UL
ABSOLUTE MONOCYTE (OHS): 0.88 K/UL (ref 0.3–1)
ABSOLUTE NEUTROPHIL COUNT (OHS): 5.95 K/UL (ref 1.8–7.7)
ALBUMIN SERPL BCP-MCNC: 4.3 G/DL (ref 3.5–5.2)
ALP SERPL-CCNC: 70 UNIT/L (ref 40–150)
ALT SERPL W/O P-5'-P-CCNC: 42 UNIT/L (ref 10–44)
ANION GAP (OHS): 10 MMOL/L (ref 8–16)
AST SERPL-CCNC: 27 UNIT/L (ref 11–45)
BASOPHILS # BLD AUTO: 0.04 K/UL
BASOPHILS NFR BLD AUTO: 0.3 %
BILIRUB SERPL-MCNC: 0.2 MG/DL (ref 0.1–1)
BILIRUB UR QL STRIP.AUTO: NEGATIVE
BUN SERPL-MCNC: 18 MG/DL (ref 6–20)
CALCIUM SERPL-MCNC: 8.7 MG/DL (ref 8.7–10.5)
CHLORIDE SERPL-SCNC: 105 MMOL/L (ref 95–110)
CLARITY UR: CLEAR
CO2 SERPL-SCNC: 22 MMOL/L (ref 23–29)
COLOR UR AUTO: YELLOW
CREAT SERPL-MCNC: 0.8 MG/DL (ref 0.5–1.4)
ERYTHROCYTE [DISTWIDTH] IN BLOOD BY AUTOMATED COUNT: 12.5 % (ref 11.5–14.5)
GFR SERPLBLD CREATININE-BSD FMLA CKD-EPI: >60 ML/MIN/1.73/M2
GLUCOSE SERPL-MCNC: 143 MG/DL (ref 70–110)
GLUCOSE UR QL STRIP: NEGATIVE
HCT VFR BLD AUTO: 44.8 % (ref 40–54)
HGB BLD-MCNC: 15.1 GM/DL (ref 14–18)
HGB UR QL STRIP: ABNORMAL
IMM GRANULOCYTES # BLD AUTO: 0.03 K/UL (ref 0–0.04)
IMM GRANULOCYTES NFR BLD AUTO: 0.3 % (ref 0–0.5)
KETONES UR QL STRIP: NEGATIVE
LEUKOCYTE ESTERASE UR QL STRIP: NEGATIVE
LIPASE SERPL-CCNC: 43 U/L (ref 4–60)
LYMPHOCYTES # BLD AUTO: 4.36 K/UL (ref 1–4.8)
MCH RBC QN AUTO: 31 PG (ref 27–31)
MCHC RBC AUTO-ENTMCNC: 33.7 G/DL (ref 32–36)
MCV RBC AUTO: 92 FL (ref 82–98)
MICROSCOPIC COMMENT: ABNORMAL
NITRITE UR QL STRIP: NEGATIVE
NUCLEATED RBC (/100WBC) (OHS): 0 /100 WBC
PH UR STRIP: 6 [PH]
PLATELET # BLD AUTO: 214 K/UL (ref 150–450)
PMV BLD AUTO: 10.7 FL (ref 9.2–12.9)
POTASSIUM SERPL-SCNC: 4.2 MMOL/L (ref 3.5–5.1)
PROT SERPL-MCNC: 8.1 GM/DL (ref 6–8.4)
PROT UR QL STRIP: NEGATIVE
RBC # BLD AUTO: 4.87 M/UL (ref 4.6–6.2)
RBC #/AREA URNS AUTO: >100 /HPF (ref 0–4)
RELATIVE EOSINOPHIL (OHS): 3.8 %
RELATIVE LYMPHOCYTE (OHS): 37.2 % (ref 18–48)
RELATIVE MONOCYTE (OHS): 7.5 % (ref 4–15)
RELATIVE NEUTROPHIL (OHS): 50.9 % (ref 38–73)
SODIUM SERPL-SCNC: 137 MMOL/L (ref 136–145)
SP GR UR STRIP: 1.03
UROBILINOGEN UR STRIP-ACNC: NEGATIVE EU/DL
WBC # BLD AUTO: 11.71 K/UL (ref 3.9–12.7)
WBC #/AREA URNS AUTO: 2 /HPF (ref 0–5)

## 2025-04-12 PROCEDURE — 99283 EMERGENCY DEPT VISIT LOW MDM: CPT

## 2025-04-12 PROCEDURE — 81003 URINALYSIS AUTO W/O SCOPE: CPT | Performed by: STUDENT IN AN ORGANIZED HEALTH CARE EDUCATION/TRAINING PROGRAM

## 2025-04-12 PROCEDURE — 83690 ASSAY OF LIPASE: CPT | Performed by: STUDENT IN AN ORGANIZED HEALTH CARE EDUCATION/TRAINING PROGRAM

## 2025-04-12 PROCEDURE — 85025 COMPLETE CBC W/AUTO DIFF WBC: CPT | Performed by: STUDENT IN AN ORGANIZED HEALTH CARE EDUCATION/TRAINING PROGRAM

## 2025-04-12 PROCEDURE — 82040 ASSAY OF SERUM ALBUMIN: CPT | Performed by: STUDENT IN AN ORGANIZED HEALTH CARE EDUCATION/TRAINING PROGRAM

## 2025-04-12 RX ORDER — IBUPROFEN 600 MG/1
600 TABLET ORAL EVERY 6 HOURS PRN
Qty: 20 TABLET | Refills: 0 | Status: SHIPPED | OUTPATIENT
Start: 2025-04-12

## 2025-04-12 RX ORDER — ONDANSETRON HYDROCHLORIDE 2 MG/ML
4 INJECTION, SOLUTION INTRAVENOUS
Status: DISCONTINUED | OUTPATIENT
Start: 2025-04-12 | End: 2025-04-12 | Stop reason: HOSPADM

## 2025-04-12 RX ORDER — KETOROLAC TROMETHAMINE 30 MG/ML
15 INJECTION, SOLUTION INTRAMUSCULAR; INTRAVENOUS
Status: DISCONTINUED | OUTPATIENT
Start: 2025-04-12 | End: 2025-04-12 | Stop reason: HOSPADM

## 2025-04-12 NOTE — ED PROVIDER NOTES
Encounter Date: 4/12/2025       History     Chief Complaint   Patient presents with    Flank Pain     Pt presents w/ pain to his L flank x 1 hr. Thinks it is a kidney stone. Pt reports PMH of kidney stone.      35 y.o. male with history below presents for evaluation of left flank pain.  Reports flank pain started just prior to arrival.  Did have some dysuria and urinary urgency however whenever he sent down to urinate noticed severe left flank pain and presented to the emergency room.  History of kidney stones and tells me this feels similar.  When providing a urine sample in the emergency room, passed a small stone into the urine cup.  Now pain-free.  No abdominal pain.  No nausea or vomiting.  No fever or chills.  The patient otherwise denies Chest Pain, Shortness of Breath, Abdominal Pain, N/V/D/Constipation, Eye complaints or Visual changes, Ear complaints, Neck or Back Pain, and Myalgias    Triage vitals were reviewed and are: Initial Vitals [04/12/25 0241]  BP: 125/77  Pulse: 81  Resp: 20  Temp: 97.5 °F (36.4 °C)  SpO2: 98 %  MAP: n/a    The Patient:   has no past medical history on file.   has no past surgical history on file.   reports that he has been smoking cigarettes. He has a 15 pack-year smoking history. He has been exposed to tobacco smoke. He has never used smokeless tobacco. He reports that he does not drink alcohol and does not use drugs.  Has allergies listed as: Patient has no known allergies.        The history is provided by the patient. No  was used.     Review of patient's allergies indicates:  No Known Allergies  No past medical history on file.  No past surgical history on file.  Family History   Problem Relation Name Age of Onset    No Known Problems Mother      Cancer Father          colon cancer    No Known Problems Sister      No Known Problems Brother      No Known Problems Maternal Grandmother      No Known Problems Maternal Grandfather      No Known Problems  Paternal Grandmother      No Known Problems Paternal Grandfather      No Known Problems Daughter      No Known Problems Son      No Known Problems Maternal Aunt      No Known Problems Maternal Uncle      No Known Problems Paternal Aunt      No Known Problems Paternal Uncle      No Known Problems Maternal Cousin      No Known Problems Paternal Cousin      No Known Problems Other       Social History[1]  Review of Systems   All other systems reviewed and are negative.      Physical Exam     Initial Vitals [04/12/25 0241]   BP Pulse Resp Temp SpO2   125/77 81 20 97.5 °F (36.4 °C) 98 %      MAP       --         Physical Exam    Nursing note and vitals reviewed.  Constitutional: He appears well-developed and well-nourished.   Body mass index is 33.15 kg/m².   HENT:   Head: Normocephalic and atraumatic.   Eyes: EOM are normal. Pupils are equal, round, and reactive to light.   Neck: Neck supple.   Cardiovascular:  Normal rate, regular rhythm, normal heart sounds and intact distal pulses.           Pulmonary/Chest: Breath sounds normal. No respiratory distress.   Abdominal: Abdomen is soft. He exhibits no distension. There is no abdominal tenderness.   Musculoskeletal:      Cervical back: Neck supple.     Neurological: He is alert and oriented to person, place, and time. GCS score is 15. GCS eye subscore is 4. GCS verbal subscore is 5. GCS motor subscore is 6.   Skin: Skin is warm and dry. Capillary refill takes less than 2 seconds.   Psychiatric: He has a normal mood and affect. His behavior is normal.         ED Course   Procedures  Labs Reviewed   COMPREHENSIVE METABOLIC PANEL - Abnormal       Result Value    Sodium 137      Potassium 4.2      Chloride 105      CO2 22 (*)     Glucose 143 (*)     BUN 18      Creatinine 0.8      Calcium 8.7      Protein Total 8.1      Albumin 4.3      Bilirubin Total 0.2      ALP 70      AST 27      ALT 42      Anion Gap 10      eGFR >60      Narrative:     Specimen slightly hemolyzed.    URINALYSIS, REFLEX TO URINE CULTURE - Abnormal    Color, UA Yellow      Appearance, UA Clear      pH, UA 6.0      Spec Grav UA 1.030      Protein, UA Negative      Glucose, UA Negative      Ketones, UA Negative      Bilirubin, UA Negative      Blood, UA 2+ (*)     Nitrites, UA Negative      Urobilinogen, UA Negative      Leukocyte Esterase, UA Negative     URINALYSIS MICROSCOPIC - Abnormal    RBC, UA >100 (*)     WBC, UA 2      Microscopic Comment       LIPASE - Normal    Lipase Level 43     CBC WITH DIFFERENTIAL - Normal    WBC 11.71      RBC 4.87      HGB 15.1      HCT 44.8      MCV 92      MCH 31.0      MCHC 33.7      RDW 12.5      Platelet Count 214      MPV 10.7      Nucleated RBC 0      Neut % 50.9      Lymph % 37.2      Mono % 7.5      Eos % 3.8      Basophil % 0.3      Imm Grans % 0.3      Neut # 5.95      Lymph # 4.36      Mono # 0.88      Eos # 0.45      Baso # 0.04      Imm Grans # 0.03     CBC W/ AUTO DIFFERENTIAL    Narrative:     The following orders were created for panel order CBC W/ AUTO DIFFERENTIAL.  Procedure                               Abnormality         Status                     ---------                               -----------         ------                     CBC with Differential[[id number]]       Normal              Final result                 Please view results for these tests on the individual orders.          Imaging Results    None          Medications   ketorolac injection 15 mg (15 mg Intravenous Not Given 4/12/25 0300)   ondansetron injection 4 mg (4 mg Intravenous Not Given 4/12/25 0300)     Medical Decision Making  Encounter Date: 4/12/2025  --------------------------------------------------------------------------  35 y.o. male presents for evaluation of flank pain.  Hemodynamically stable. Afebrile. Phonating and protecting the airway spontaneously. No clinical evidence for cardiovascular instability or impending airway compromise.  Remainder of physical exam as  "above.    Additional or Independent Historians available and contributing to the history as above: NONE  Prior medical records, when available, were reviewed. This includes a review of the patients comorbidities, medications, and recent hospital or outpatient notes.   Comorbid Conditions affecting evaluation, treatment or discharge planning: NONE IDENTIFIED   Social Determinates of Health identified and considered in the evaluation and treatment of this patient: None Identified or significantly impacting evaluation and treatment    Differential diagnoses includes but is not limited to:   AAA, aortic dissection, SBO/volvulus, intussusception, ileus, appendicitis, cholecystitis, hepatitis, nephrolithiasis, pancreatitis, IBD/IBS, biliary colic, GERD, PUD, constipation, UTI/pyelonephritis, musculoskeletal pain.  --------------------------------------------------------------------------  All available clinical tests were reviewed by me and documented in the ED course.  Vitals range:   Temp:  [97.5 °F (36.4 °C)] 97.5 °F (36.4 °C)  Pulse:  [81] 81  Resp:  [20] 20  SpO2:  [98 %] 98 %  BP: (125)/(77) 125/77  Estimated body mass index is 33.15 kg/m² as calculated from the following:    Height as of this encounter: 5' 8" (1.727 m).    Weight as of this encounter: 98.9 kg (218 lb).    ED MEDS GIVEN:  Medications  ketorolac injection 15 mg (15 mg Intravenous Not Given 4/12/25 0300)  ondansetron injection 4 mg (4 mg Intravenous Not Given 4/12/25 0300)    Procedures Performed: None  --------------------------------------------------------------------------  Problem #1:  Nephrolithiasis  Patient presents for evaluation of flank pain.  History of kidney stones and this feels the same.  Overall nontoxic and well-appearing.  Passed the stone into his urine specimen which I visualized.  Has no residual pain.  Lab work is reassuring.  Normal kidney function.  Engaged in shared decision-making regarding imaging.  Given the patient is now " pain-free and had a passed stone, low suspicion for residual necessitating CT imaging.  No evidence of infection.  Encouraged follow up with PCM.    I see no indication of an emergent process beyond that addressed during our encounter but have duly counseled the patient/family regarding the need for prompt follow-up as well as the indications that should prompt immediate return to the emergency room should new or worrisome developments occur.  The patient/family has been provided with verbal and printed direction regarding our final diagnosis(es) as well as instructions regarding use of OTC and/or Rx medications intended to manage the patient's conditions.   The patient/family communicates understanding of all this information and all remaining questions and concerns were addressed at this time.  DISCLAIMER: This note was prepared with [employer] voice recognition transcription software. Garbled syntax, mangled pronouns, and other bizarre constructions may be attributed to that software system.    Final diagnoses:  [N20.1] Ureterolithiasis (Primary)                Amount and/or Complexity of Data Reviewed  Labs: ordered. Decision-making details documented in ED Course.    Risk  Prescription drug management.               ED Course as of 04/12/25 0407   Sat Apr 12, 2025   0316 BP: 125/77 [AN]   0316 Temp: 97.5 °F (36.4 °C) [AN]   0316 Pulse: 81 [AN]   0316 Resp: 20 [AN]   0316 SpO2: 98 % [AN]   0346 CBC W/ AUTO DIFFERENTIAL  No leukocytosis.  No anemia.  Normal platelets. [AN]   0346 Comp. Metabolic Panel(!)  No significant metabolic abnormality.  Normal hepatic and renal function. [AN]   0346 Urinalysis Microscopic(!)  UA with hematuria.  No convincing evidence of urinary tract infection. [AN]   0346 Lipase [AN]      ED Course User Index  [AN] Jarek Roblero PA-C                           Clinical Impression:  Final diagnoses:  [N20.1] Ureterolithiasis (Primary)          ED Disposition Condition    Discharge Stable           ED Prescriptions       Medication Sig Dispense Start Date End Date Auth. Provider    ibuprofen (ADVIL,MOTRIN) 600 MG tablet Take 1 tablet (600 mg total) by mouth every 6 (six) hours as needed for Pain. 20 tablet 4/12/2025 -- Jarek Roblero PA-C          Follow-up Information       Follow up With Specialties Details Why Contact Info    [facility] Emergency Dept Emergency Medicine Go to  As needed, If symptoms worsen [address] [facility] [place] [address] [phone]    Primary Care Manager  Schedule an appointment as soon as possible for a visit in 1 week                 [1]   Social History  Tobacco Use    Smoking status: Every Day     Current packs/day: 1.00     Average packs/day: 1 pack/day for 15.0 years (15.0 ttl pk-yrs)     Types: Cigarettes     Passive exposure: Current    Smokeless tobacco: Never   Substance Use Topics    Alcohol use: Never    Drug use: Never        Jarek Roblero PA-C  04/12/25 0402

## 2025-04-18 ENCOUNTER — PATIENT MESSAGE (OUTPATIENT)
Facility: CLINIC | Age: 35
End: 2025-04-18
Payer: MEDICAID

## 2025-04-22 ENCOUNTER — LAB VISIT (OUTPATIENT)
Dept: LAB | Facility: HOSPITAL | Age: 35
End: 2025-04-22
Payer: MEDICAID

## 2025-04-22 ENCOUNTER — OFFICE VISIT (OUTPATIENT)
Facility: CLINIC | Age: 35
End: 2025-04-22
Payer: MEDICAID

## 2025-04-22 VITALS
HEIGHT: 68 IN | BODY MASS INDEX: 33.76 KG/M2 | RESPIRATION RATE: 18 BRPM | SYSTOLIC BLOOD PRESSURE: 103 MMHG | TEMPERATURE: 98 F | OXYGEN SATURATION: 93 % | WEIGHT: 222.75 LBS | HEART RATE: 76 BPM | DIASTOLIC BLOOD PRESSURE: 78 MMHG

## 2025-04-22 DIAGNOSIS — E78.5 HYPERLIPIDEMIA, UNSPECIFIED HYPERLIPIDEMIA TYPE: Primary | ICD-10-CM

## 2025-04-22 DIAGNOSIS — R05.9 COUGH, UNSPECIFIED TYPE: ICD-10-CM

## 2025-04-22 DIAGNOSIS — E78.5 HYPERLIPIDEMIA, UNSPECIFIED HYPERLIPIDEMIA TYPE: ICD-10-CM

## 2025-04-22 LAB
CHOLEST SERPL-MCNC: 331 MG/DL (ref 120–199)
CHOLEST/HDLC SERPL: 8.5 {RATIO} (ref 2–5)
HDLC SERPL-MCNC: 39 MG/DL (ref 40–75)
HDLC SERPL: 11.8 % (ref 20–50)
LDLC SERPL CALC-MCNC: 260.8 MG/DL (ref 63–159)
NONHDLC SERPL-MCNC: 292 MG/DL
TRIGL SERPL-MCNC: 156 MG/DL (ref 30–150)

## 2025-04-22 PROCEDURE — 99999 PR PBB SHADOW E&M-EST. PATIENT-LVL IV: CPT | Mod: PBBFAC,,,

## 2025-04-22 PROCEDURE — G2211 COMPLEX E/M VISIT ADD ON: HCPCS | Mod: S$PBB,,,

## 2025-04-22 PROCEDURE — 1159F MED LIST DOCD IN RCRD: CPT | Mod: CPTII,,,

## 2025-04-22 PROCEDURE — 3074F SYST BP LT 130 MM HG: CPT | Mod: CPTII,,,

## 2025-04-22 PROCEDURE — 3078F DIAST BP <80 MM HG: CPT | Mod: CPTII,,,

## 2025-04-22 PROCEDURE — 3008F BODY MASS INDEX DOCD: CPT | Mod: CPTII,,,

## 2025-04-22 PROCEDURE — 1160F RVW MEDS BY RX/DR IN RCRD: CPT | Mod: CPTII,,,

## 2025-04-22 PROCEDURE — 80061 LIPID PANEL: CPT

## 2025-04-22 PROCEDURE — 99214 OFFICE O/P EST MOD 30 MIN: CPT | Mod: S$PBB,,,

## 2025-04-22 PROCEDURE — 99214 OFFICE O/P EST MOD 30 MIN: CPT | Mod: PBBFAC,PN

## 2025-04-27 ENCOUNTER — RESULTS FOLLOW-UP (OUTPATIENT)
Facility: CLINIC | Age: 35
End: 2025-04-27
Payer: MEDICAID

## 2025-04-27 DIAGNOSIS — E78.5 HYPERLIPIDEMIA, UNSPECIFIED HYPERLIPIDEMIA TYPE: Primary | ICD-10-CM

## 2025-04-27 RX ORDER — ATORVASTATIN CALCIUM 20 MG/1
20 TABLET, FILM COATED ORAL DAILY
Qty: 30 TABLET | Refills: 2 | Status: SHIPPED | OUTPATIENT
Start: 2025-04-27 | End: 2025-07-26

## 2025-04-27 NOTE — PROGRESS NOTES
SUBJECTIVE     Chief Complaint   Patient presents with    Follow-up    Labs Only       HPI  Amaury Hair is a 35 y.o. male with multiple medical diagnoses as listed in the medical history and problem list that presents for follow-up.    History of Present Illness    CHIEF COMPLAINT:  Mr. Hair presents today for follow up to [facility] labs and concerns of elevated cholesterol    RECENT SYMPTOMS:  d He had a cough that has now resolved, for which he used nasal spray and inhaler but declined prescribed oral steroids from urgent care.    RECENT MEDICAL EVENTS:  He had a recent emergency room visit for kidney stone. He also noted a whitish pimple-like lesion in the posterior genital area that self-resolved within two days after bursting, without associated pain.    DIET AND SUPPLEMENTS:  He reports eating only daily and takes omega-3 supplements.    FAMILY HISTORY:  Father  from colon cancer.      ROS:  General: -fever, -chills, -fatigue, -weight gain, -weight loss  Eyes: -vision changes, -redness, -discharge  ENT: -ear pain, -nasal congestion, -sore throat  Cardiovascular: +chest pain, -palpitations, -lower extremity edema  Respiratory: -cough, -shortness of breath  Gastrointestinal: -abdominal pain, -nausea, -vomiting, -diarrhea, -constipation, -blood in stool  Genitourinary: -dysuria, -hematuria, -frequency  Musculoskeletal: -joint pain, -muscle pain  Skin: -rash, -lesion  Neurological: -headache, -dizziness, -numbness, -tingling  Psychiatric: -anxiety, -depression, -sleep difficulty         PAST MEDICAL HISTORY:  History reviewed. No pertinent past medical history.    PAST SURGICAL HISTORY:  History reviewed. No pertinent surgical history.    SOCIAL HISTORY:  Social History[1]    FAMILY HISTORY:  Family History   Problem Relation Name Age of Onset    No Known Problems Mother      Cancer Father          colon cancer    No Known Problems Sister      No Known Problems Brother      No Known Problems Maternal  "Grandmother      No Known Problems Maternal Grandfather      No Known Problems Paternal Grandmother      No Known Problems Paternal Grandfather      No Known Problems Daughter      No Known Problems Son      No Known Problems Maternal Aunt      No Known Problems Maternal Uncle      No Known Problems Paternal Aunt      No Known Problems Paternal Uncle      No Known Problems Maternal Cousin      No Known Problems Paternal Cousin      No Known Problems Other         ALLERGIES AND MEDICATIONS: updated and reviewed.  Review of patient's allergies indicates:  No Known Allergies  Current Medications[2]      OBJECTIVE     Physical Exam  Vitals:    04/22/25 1436   BP: 103/78   Pulse: 76   Resp: 18   Temp: 97.8 °F (36.6 °C)    Body mass index is 33.87 kg/m².  Weight: 101.1 kg (222 lb 12.4 oz)   Height: 5' 8" (172.7 cm)     Physical Exam  Constitutional:       General: He is not in acute distress.     Appearance: Normal appearance. He is obese. He is not ill-appearing or diaphoretic.   HENT:      Right Ear: Tympanic membrane normal. There is no impacted cerumen.      Left Ear: Tympanic membrane normal. There is no impacted cerumen.      Nose: Nose normal. No congestion or rhinorrhea.      Mouth/Throat:      Mouth: Mucous membranes are moist.      Pharynx: Oropharynx is clear. No oropharyngeal exudate or posterior oropharyngeal erythema.   Eyes:      General:         Right eye: No discharge.         Left eye: No discharge.   Cardiovascular:      Rate and Rhythm: Normal rate and regular rhythm.      Pulses: Normal pulses.      Heart sounds: Normal heart sounds.   Pulmonary:      Effort: Pulmonary effort is normal.      Breath sounds: Normal breath sounds.   Abdominal:      General: Bowel sounds are normal. There is no distension.      Palpations: Abdomen is soft.      Tenderness: There is no abdominal tenderness. There is no guarding.   Musculoskeletal:         General: No swelling or tenderness.      Cervical back: Normal range " of motion. No rigidity or tenderness.      Right lower leg: No edema.      Left lower leg: No edema.   Skin:     General: Skin is warm and dry.      Findings: No bruising, erythema, lesion or rash.   Neurological:      Mental Status: He is alert and oriented to person, place, and time.      Motor: No weakness.      Gait: Gait normal.   Psychiatric:         Mood and Affect: Mood normal.         Behavior: Behavior normal.         Health Maintenance         Date Due Completion Date    TETANUS VACCINE Never done ---    Pneumococcal Vaccines (Age 0-49) (1 of 2 - PCV) Never done ---    High Dose Statin Never done ---    Influenza Vaccine (1) Never done ---    COVID-19 Vaccine (4 - 2024-25 season) 09/01/2024 1/11/2022    Hemoglobin A1c (Diabetic Prevention Screening) 10/14/2027 10/14/2024    Lipid Panel 04/22/2030 4/22/2025    RSV Vaccine (Age 60+ and Pregnant patients) (1 - 1-dose 75+ series) 02/17/2064 ---              ASSESSMENT     35 y.o. male with     1. Hyperlipidemia, unspecified hyperlipidemia type    2. Cough, unspecified type        PLAN:     1. Hyperlipidemia, unspecified hyperlipidemia type  Uncontrolled; assessing  - Lipid Panel; Future    2. Cough, unspecified type  Improved; Assessed     Assessment & Plan    E78.5 Hyperlipidemia, unspecified  N20.9 Urinary calculus, unspecified  R05.9 Cough, unspecified  Z87.442 Personal history of urinary calculi  Z80.0 Family history of malignant neoplasm of digestive organs    IMPRESSION:  - Reviewed history of elevated cholesterol and previous recommendations for treatment.  - Considered intervention if cholesterol levels have not improved with lifestyle changes and omega-3 supplementation.  - Noted report of transient left-sided chest pain, but no recurrence or associated symptoms.  - Assessed resolved skin lesion in private area, likely an ingrown hair or sebaceous cyst that resolved spontaneously.    HYPERLIPIDEMIA:  - Explained potential risks of elevated  cholesterol, including increased risk of stroke and heart attack.  - Ordered cholesterol blood test.  - Noted that the patient's cholesterol levels were last checked in October and are due for a recheck.  - Acknowledged that previous cholesterol levels were elevated and need to be addressed.  - Recognized that the patient has been taking omega-3 supplements and watching diet.    KIDNEY STONES:  - Reviewed the patient's recent history of kidney stones, including a recent episode requiring an emergency room visit.  - Noted that the patient reports a recent kidney stone episode that required an emergency room visit.  - no pains today or reoccurrences noted    COUGH:  - Noted that patient reports an intermittent cough that has now cleared up.  - Recorded that the patient used nasal spray and inhaler for treatment, but did not take prescribed steroids.    FAMILY HISTORY OF COLON CANCER:  - Noted that father passed away from colon cancer.  - Recommend cancer screening at age 40 due to family history of colon cancer.    FOLLOW-UP:  - Scheduled follow up in 3 months and to reassess cholesterol management.    - follow up sooner if needed    I spent a total of 40 minutes on the day of the visit.This includes face to face time and non-face to face time preparing to see the patient (eg, review of tests), obtaining and/or reviewing separately obtained history, documenting clinical information in the electronic or other health record, independently interpreting results and communicating results to the patient/family/caregiver, or care coordinator.       Chandler Fischer DNP, APRN, FNP-BC [facility]  04/27/2025 6:31 PM    This note was generated with the assistance of ambient listening technology. Verbal consent was obtained by the patient and accompanying visitor(s) for the recording of patient appointment to facilitate this note. I attest to having reviewed and edited the generated note for accuracy, though some  syntax or spelling errors may persist. Please contact the author of this note for any clarification.            [1]   Social History  Socioeconomic History    Marital status:    Tobacco Use    Smoking status: Every Day     Current packs/day: 1.00     Average packs/day: 1 pack/day for 15.0 years (15.0 ttl pk-yrs)     Types: Cigarettes     Passive exposure: Current    Smokeless tobacco: Never   Substance and Sexual Activity    Alcohol use: Never    Drug use: Never    Sexual activity: Yes     Partners: Female     Social Drivers of Health     Financial Resource Strain: Low Risk  (3/13/2025)    Overall Financial Resource Strain (CARDIA)     Difficulty of Paying Living Expenses: Not hard at all   Food Insecurity: No Food Insecurity (3/13/2025)    Hunger Vital Sign     Worried About Running Out of Food in the Last Year: Never true     Ran Out of Food in the Last Year: Never true   Recent Concern: Food Insecurity - Food Insecurity Present (1/21/2025)    Hunger Vital Sign     Worried About Running Out of Food in the Last Year: Never true     Ran Out of Food in the Last Year: Sometimes true   Transportation Needs: No Transportation Needs (3/13/2025)    PRAPARE - Transportation     Lack of Transportation (Medical): No     Lack of Transportation (Non-Medical): No   Physical Activity: Sufficiently Active (3/13/2025)    Exercise Vital Sign     Days of Exercise per Week: 4 days     Minutes of Exercise per Session: 60 min   Recent Concern: Physical Activity - Insufficiently Active (1/21/2025)    Exercise Vital Sign     Days of Exercise per Week: 2 days     Minutes of Exercise per Session: 60 min   Stress: No Stress Concern Present (3/13/2025)    [employer] - Occupational Stress Questionnaire     Feeling of Stress : Not at all   Recent Concern: Stress - Stress Concern Present (1/21/2025)    [employer] - Occupational Stress Questionnaire     Feeling of Stress : To  some extent   Housing Stability: Low Risk  (3/13/2025)    Housing Stability Vital Sign     Unable to Pay for Housing in the Last Year: No     Homeless in the Last Year: No   [2]   Current Outpatient Medications   Medication Sig Dispense Refill    acetaminophen (TYLENOL) 500 MG tablet Take 2 tablets (1,000 mg total) by mouth 3 (three) times daily as needed for Pain. 30 tablet 0    albuterol (VENTOLIN HFA) 90 mcg/actuation inhaler Inhale 2 puffs into the lungs every 6 (six) hours as needed for Wheezing. Rescue 18 g 0    azelastine (ASTELIN) 137 mcg (0.1 %) nasal spray 1 spray 2 (two) times daily.      atorvastatin (LIPITOR) 20 MG tablet Take 1 tablet (20 mg total) by mouth once daily. 30 tablet 2    cetirizine (ZYRTEC) 10 MG tablet Take 1 tablet (10 mg total) by mouth once daily. 30 tablet 0    ibuprofen (ADVIL,MOTRIN) 600 MG tablet Take 1 tablet (600 mg total) by mouth every 6 (six) hours as needed for Pain. (Patient not taking: Reported on 4/22/2025) 20 tablet 0    promethazine (PHENERGAN) 25 MG tablet Take 1 tablet (25 mg total) by mouth every 6 (six) hours as needed for Nausea. (Patient not taking: Reported on 4/22/2025) 20 tablet 0    tamsulosin (FLOMAX) 0.4 mg Cap Take 1 capsule (0.4 mg total) by mouth once daily. 10 capsule 0     No current facility-administered medications for this visit.

## 2025-05-29 ENCOUNTER — PATIENT MESSAGE (OUTPATIENT)
Facility: CLINIC | Age: 35
End: 2025-05-29
Payer: MEDICAID